# Patient Record
Sex: FEMALE | Race: WHITE | URBAN - METROPOLITAN AREA
[De-identification: names, ages, dates, MRNs, and addresses within clinical notes are randomized per-mention and may not be internally consistent; named-entity substitution may affect disease eponyms.]

---

## 2023-02-28 LAB
ALANINE AMINOTRANSFERASE (SGPT) (U/L) IN SER/PLAS: 41 U/L (ref 7–45)
ALBUMIN (G/DL) IN SER/PLAS: 4.4 G/DL (ref 3.4–5)
ALKALINE PHOSPHATASE (U/L) IN SER/PLAS: 80 U/L (ref 33–110)
ANION GAP IN SER/PLAS: 14 MMOL/L (ref 10–20)
ASPARTATE AMINOTRANSFERASE (SGOT) (U/L) IN SER/PLAS: 17 U/L (ref 9–39)
BILIRUBIN TOTAL (MG/DL) IN SER/PLAS: 0.4 MG/DL (ref 0–1.2)
CALCIUM (MG/DL) IN SER/PLAS: 9.4 MG/DL (ref 8.6–10.3)
CARBON DIOXIDE, TOTAL (MMOL/L) IN SER/PLAS: 26 MMOL/L (ref 21–32)
CHLORIDE (MMOL/L) IN SER/PLAS: 102 MMOL/L (ref 98–107)
CREATININE (MG/DL) IN SER/PLAS: 0.64 MG/DL (ref 0.5–1.05)
DEAMIDATED GLIADIN PEPTIDE IGA: <1 U/ML (ref 0–14)
DEAMIDATED GLIADIN PEPTIDE IGG: <1 U/ML (ref 0–14)
ERYTHROCYTE DISTRIBUTION WIDTH (RATIO) BY AUTOMATED COUNT: 12 % (ref 11.5–14.5)
ERYTHROCYTE MEAN CORPUSCULAR HEMOGLOBIN CONCENTRATION (G/DL) BY AUTOMATED: 33 G/DL (ref 32–36)
ERYTHROCYTE MEAN CORPUSCULAR VOLUME (FL) BY AUTOMATED COUNT: 91 FL (ref 80–100)
ERYTHROCYTES (10*6/UL) IN BLOOD BY AUTOMATED COUNT: 4.7 X10E12/L (ref 4–5.2)
GFR FEMALE: >90 ML/MIN/1.73M2
GLUCOSE (MG/DL) IN SER/PLAS: 78 MG/DL (ref 74–99)
HEMATOCRIT (%) IN BLOOD BY AUTOMATED COUNT: 42.7 % (ref 36–46)
HEMOGLOBIN (G/DL) IN BLOOD: 14.1 G/DL (ref 12–16)
LEUKOCYTES (10*3/UL) IN BLOOD BY AUTOMATED COUNT: 7.4 X10E9/L (ref 4.4–11.3)
PLATELETS (10*3/UL) IN BLOOD AUTOMATED COUNT: 287 X10E9/L (ref 150–450)
POTASSIUM (MMOL/L) IN SER/PLAS: 4.4 MMOL/L (ref 3.5–5.3)
PROTEIN TOTAL: 7.3 G/DL (ref 6.4–8.2)
SODIUM (MMOL/L) IN SER/PLAS: 138 MMOL/L (ref 136–145)
TISSUE TRANSGLUTAMINASE IGG: <1 U/ML (ref 0–14)
TISSUE TRANSGLUTAMINASE, IGA: <1 U/ML (ref 0–14)
UREA NITROGEN (MG/DL) IN SER/PLAS: 13 MG/DL (ref 6–23)

## 2023-03-16 PROBLEM — M72.2 PLANTAR FASCIITIS, RIGHT: Status: ACTIVE | Noted: 2023-03-16

## 2023-03-16 PROBLEM — M54.50 RIGHT-SIDED LOW BACK PAIN WITHOUT SCIATICA: Status: ACTIVE | Noted: 2023-03-16

## 2023-03-16 PROBLEM — I10 HYPERTENSION: Status: ACTIVE | Noted: 2023-03-16

## 2023-03-16 PROBLEM — J45.909 AB (ASTHMATIC BRONCHITIS) (HHS-HCC): Status: ACTIVE | Noted: 2023-03-16

## 2023-03-16 PROBLEM — E03.9 HYPOTHYROIDISM: Status: ACTIVE | Noted: 2023-03-16

## 2023-03-16 PROBLEM — F33.0 MILD EPISODE OF RECURRENT MAJOR DEPRESSIVE DISORDER (CMS-HCC): Status: ACTIVE | Noted: 2023-03-16

## 2023-03-16 PROBLEM — E66.01 MORBID OBESITY (MULTI): Status: ACTIVE | Noted: 2023-03-16

## 2023-03-16 PROBLEM — R10.2 PELVIC PAIN: Status: ACTIVE | Noted: 2023-03-16

## 2023-03-16 PROBLEM — R14.0 ABDOMINAL BLOATING: Status: ACTIVE | Noted: 2023-03-16

## 2023-03-16 PROBLEM — M54.9 BACK PAIN: Status: ACTIVE | Noted: 2023-03-16

## 2023-03-16 PROBLEM — G56.03 CARPAL TUNNEL SYNDROME ON BOTH SIDES: Status: ACTIVE | Noted: 2023-03-16

## 2023-03-16 PROBLEM — F41.9 ANXIETY: Status: ACTIVE | Noted: 2023-03-16

## 2023-03-16 PROBLEM — E66.811 OBESITY (BMI 30.0-34.9): Status: ACTIVE | Noted: 2023-03-16

## 2023-03-16 PROBLEM — R12 HEARTBURN: Status: ACTIVE | Noted: 2023-03-16

## 2023-03-16 PROBLEM — G57.81 NEURITIS OF RIGHT SURAL NERVE: Status: ACTIVE | Noted: 2023-03-16

## 2023-03-16 PROBLEM — K64.8 INTERNAL AND EXTERNAL PROLAPSED HEMORRHOIDS: Status: ACTIVE | Noted: 2023-03-16

## 2023-03-16 PROBLEM — K64.4 EXTERNAL HEMORRHOIDS: Status: ACTIVE | Noted: 2023-03-16

## 2023-03-16 PROBLEM — F41.1 GAD (GENERALIZED ANXIETY DISORDER): Status: ACTIVE | Noted: 2023-03-16

## 2023-03-16 PROBLEM — E66.9 OBESITY (BMI 30.0-34.9): Status: ACTIVE | Noted: 2023-03-16

## 2023-03-16 RX ORDER — LEVOTHYROXINE SODIUM 50 UG/1
1 TABLET ORAL DAILY
COMMUNITY
Start: 2022-06-27 | End: 2023-06-27 | Stop reason: SDUPTHER

## 2023-03-16 RX ORDER — METOPROLOL SUCCINATE 25 MG/1
1 TABLET, EXTENDED RELEASE ORAL DAILY
COMMUNITY
Start: 2017-11-03 | End: 2023-03-28 | Stop reason: SDUPTHER

## 2023-03-16 RX ORDER — ESCITALOPRAM OXALATE 20 MG/1
1 TABLET ORAL DAILY
COMMUNITY
Start: 2021-11-01 | End: 2023-06-27 | Stop reason: SDUPTHER

## 2023-03-16 RX ORDER — FAMOTIDINE 20 MG/1
1 TABLET, FILM COATED ORAL NIGHTLY
COMMUNITY
Start: 2022-07-20 | End: 2023-06-07 | Stop reason: ALTCHOICE

## 2023-03-16 RX ORDER — VIT C/E/ZN/COPPR/LUTEIN/ZEAXAN 250MG-90MG
1 CAPSULE ORAL DAILY
COMMUNITY
Start: 2023-02-28 | End: 2023-10-26 | Stop reason: ALTCHOICE

## 2023-03-16 RX ORDER — MINERAL OIL
180 ENEMA (ML) RECTAL DAILY
COMMUNITY
End: 2023-10-26 | Stop reason: ALTCHOICE

## 2023-03-16 RX ORDER — FLUTICASONE PROPIONATE 50 MCG
2 SPRAY, SUSPENSION (ML) NASAL DAILY
COMMUNITY
Start: 2022-06-21

## 2023-03-16 RX ORDER — CYCLOBENZAPRINE HCL 5 MG
1 TABLET ORAL 3 TIMES DAILY PRN
COMMUNITY
Start: 2023-03-01 | End: 2023-06-07 | Stop reason: ALTCHOICE

## 2023-03-16 RX ORDER — OMEPRAZOLE 40 MG/1
1 CAPSULE, DELAYED RELEASE ORAL DAILY
COMMUNITY
Start: 2020-12-24 | End: 2023-06-07

## 2023-03-16 RX ORDER — NAPROXEN 500 MG/1
1 TABLET ORAL EVERY 12 HOURS
COMMUNITY
Start: 2023-03-03 | End: 2023-06-07 | Stop reason: ALTCHOICE

## 2023-03-17 ENCOUNTER — TELEPHONE (OUTPATIENT)
Dept: PRIMARY CARE | Facility: CLINIC | Age: 43
End: 2023-03-17
Payer: COMMERCIAL

## 2023-03-17 NOTE — TELEPHONE ENCOUNTER
Her symptoms are likely related to lactose intolerance. She can avoid dairy and try Lactaid pills prior to ingesting dairy to see if that helps. She can try lactose free milk products as well. For some people as they get older they don't produce enough of the enzyme lactase which breaks down lactose in the gut. This lack of enough enzyme causes the sx she has been having. Some people are able to tolerate some types of dairy better then others.

## 2023-03-17 NOTE — TELEPHONE ENCOUNTER
Pt has not been eating dairy for 2 weeks, and she said she has noticed a change in her bloating and gas. If she does ingest dairy she still gets those symptoms

## 2023-03-27 ENCOUNTER — TELEPHONE (OUTPATIENT)
Dept: PRIMARY CARE | Facility: CLINIC | Age: 43
End: 2023-03-27
Payer: COMMERCIAL

## 2023-03-27 DIAGNOSIS — I10 PRIMARY HYPERTENSION: Primary | ICD-10-CM

## 2023-03-27 NOTE — TELEPHONE ENCOUNTER
Pt is calling in regards to her Metoprolol Succinate ER 25 MG. Her prescription that was sent over on 22 has  they were filling her prescription that was sent on 22. They need a new prescription sent.     REFILL  MEDICATION:     Metoprolol Succinate ER 25 MG; Take 1 tablet daily.     [facility]: [facility]   PHARM NUMBER: [phone]    LR: 22  90 tablets with 3 refills   LV: 22  NV: 23

## 2023-03-28 RX ORDER — METOPROLOL SUCCINATE 25 MG/1
25 TABLET, EXTENDED RELEASE ORAL DAILY
Qty: 90 TABLET | Refills: 0 | Status: SHIPPED | OUTPATIENT
Start: 2023-03-28 | End: 2023-06-27 | Stop reason: SDUPTHER

## 2023-04-11 ENCOUNTER — OFFICE VISIT (OUTPATIENT)
Dept: PRIMARY CARE | Facility: CLINIC | Age: 43
End: 2023-04-11
Payer: COMMERCIAL

## 2023-04-11 VITALS
WEIGHT: 233 LBS | DIASTOLIC BLOOD PRESSURE: 64 MMHG | BODY MASS INDEX: 34.51 KG/M2 | HEIGHT: 69 IN | SYSTOLIC BLOOD PRESSURE: 122 MMHG | TEMPERATURE: 96.9 F

## 2023-04-11 DIAGNOSIS — M54.50 LOW BACK PAIN, UNSPECIFIED BACK PAIN LATERALITY, UNSPECIFIED CHRONICITY, UNSPECIFIED WHETHER SCIATICA PRESENT: Primary | ICD-10-CM

## 2023-04-11 DIAGNOSIS — E66.9 OBESITY (BMI 30.0-34.9): ICD-10-CM

## 2023-04-11 DIAGNOSIS — E73.8 ISOLATED LACTOSE DEFICIENCY: ICD-10-CM

## 2023-04-11 DIAGNOSIS — R12 HEARTBURN: ICD-10-CM

## 2023-04-11 DIAGNOSIS — R14.0 ABDOMINAL BLOATING: ICD-10-CM

## 2023-04-11 PROCEDURE — 1036F TOBACCO NON-USER: CPT | Performed by: FAMILY MEDICINE

## 2023-04-11 PROCEDURE — 3074F SYST BP LT 130 MM HG: CPT | Performed by: FAMILY MEDICINE

## 2023-04-11 PROCEDURE — 99214 OFFICE O/P EST MOD 30 MIN: CPT | Performed by: FAMILY MEDICINE

## 2023-04-11 PROCEDURE — 3008F BODY MASS INDEX DOCD: CPT | Performed by: FAMILY MEDICINE

## 2023-04-11 PROCEDURE — 3078F DIAST BP <80 MM HG: CPT | Performed by: FAMILY MEDICINE

## 2023-04-11 ASSESSMENT — PATIENT HEALTH QUESTIONNAIRE - PHQ9
2. FEELING DOWN, DEPRESSED OR HOPELESS: NOT AT ALL
SUM OF ALL RESPONSES TO PHQ9 QUESTIONS 1 AND 2: 0
1. LITTLE INTEREST OR PLEASURE IN DOING THINGS: NOT AT ALL

## 2023-04-11 NOTE — PROGRESS NOTES
"Chief complaint:   Chief Complaint   Patient presents with    Follow-up     Medication       HPI:  Magaly Jordan is a 43 y.o. female who presents for evaluation of abdominal pain and bloating. This has essentially resolved/improved dramatically with the avoidance of dairy or use of lactase enzyme product prior to dairy intake. She has definitely noted a relation to her sx and dairy.     She has hip pain and she has back pain which has been more chronic and comes and goes. No numbness/tingling/radiation of pain at this time.    2 weeks ago went to urgent care after losing her voice. Negative COVID-19 testing at home. She was tested for strep and was negative. She had a viral illness. She still clears her throat sometimes.     Physical exam:  /64 (BP Location: Left arm, Patient Position: Sitting)   Temp 36.1 °C (96.9 °F)   Ht 1.74 m (5' 8.5\")   Wt 106 kg (233 lb)   BMI 34.91 kg/m²   General: NAD, well appearing female  Heart: RRR, no mumur appreciated  Lungs: CTAB, no wheezes, rales, rhonchi  Abdomen: soft, non tender, normoactive BS, no organomegaly  Extremities: No LE edema    Assessment/Plan   Problem List Items Addressed This Visit          Digestive    Heartburn       Endocrine/Metabolic    Obesity (BMI 30.0-34.9)    BMI 34.0-34.9,adult       Other    Abdominal bloating    Back pain - Primary    Relevant Orders    Referral to Physical Therapy    Isolated lactose deficiency   Heartburn has resolved with PPI, Bloating has resolved with avoidance of dairy  Back and hip pain, recommend PT, follow up 4-6 weeks if not improved    Magaly Rivera, DO        "

## 2023-04-12 PROBLEM — J45.909 AB (ASTHMATIC BRONCHITIS) (HHS-HCC): Status: RESOLVED | Noted: 2023-03-16 | Resolved: 2023-04-12

## 2023-04-12 PROBLEM — E73.8: Status: ACTIVE | Noted: 2023-04-12

## 2023-04-12 PROBLEM — E66.01 MORBID OBESITY (MULTI): Status: RESOLVED | Noted: 2023-03-16 | Resolved: 2023-04-12

## 2023-05-08 ENCOUNTER — TELEPHONE (OUTPATIENT)
Dept: PRIMARY CARE | Facility: CLINIC | Age: 43
End: 2023-05-08
Payer: COMMERCIAL

## 2023-05-08 DIAGNOSIS — R12 HEARTBURN: Primary | ICD-10-CM

## 2023-05-08 NOTE — TELEPHONE ENCOUNTER
Pt is calling in regards to her Omeprazole 40 MG. Pt said that you wanted her to take if for two months and then stop to see how it is. Pt said she has been off of the medication for a week and all of her symptoms have come back. She said she has bloating, gas, and heartburn. Pt is asking if you can refill this medication?     [facility]   Phone Number: [phone]

## 2023-05-09 RX ORDER — PANTOPRAZOLE SODIUM 40 MG/1
40 TABLET, DELAYED RELEASE ORAL DAILY
Qty: 90 TABLET | Refills: 1 | Status: SHIPPED | OUTPATIENT
Start: 2023-05-09 | End: 2023-06-27

## 2023-05-09 NOTE — TELEPHONE ENCOUNTER
I would like her to have H. Pylori testing prior to going back on the PPI. She will need to be off of it for a full 2 weeks minimum prior to tgetting the testing then can restart the medication which I will send for her.

## 2023-05-10 ENCOUNTER — TELEPHONE (OUTPATIENT)
Dept: PRIMARY CARE | Facility: CLINIC | Age: 43
End: 2023-05-10
Payer: COMMERCIAL

## 2023-05-10 NOTE — TELEPHONE ENCOUNTER
Pt would like to know if she can take Pepecid in those 2 weeks that she is off of Omeprazole?  Or what can she take

## 2023-05-15 ENCOUNTER — TELEPHONE (OUTPATIENT)
Dept: PRIMARY CARE | Facility: CLINIC | Age: 43
End: 2023-05-15
Payer: COMMERCIAL

## 2023-05-15 DIAGNOSIS — R12 HEARTBURN: Primary | ICD-10-CM

## 2023-05-15 NOTE — TELEPHONE ENCOUNTER
Pt called stating that she tried scheduling her H plyori stress test (unsure of correct spelling) but was told that the test was not ordered. Can you place this order so pt can schedule?

## 2023-05-16 NOTE — TELEPHONE ENCOUNTER
It was ordered and active from 5/9/2023- I reordered the test- It does not need to be scheduled, she can just go to the lab fasting and they can do it.

## 2023-05-18 ENCOUNTER — LAB (OUTPATIENT)
Dept: LAB | Facility: LAB | Age: 43
End: 2023-05-18
Payer: COMMERCIAL

## 2023-05-18 DIAGNOSIS — R12 HEARTBURN: ICD-10-CM

## 2023-05-18 PROCEDURE — 83013 H PYLORI (C-13) BREATH: CPT

## 2023-05-19 ENCOUNTER — TELEPHONE (OUTPATIENT)
Dept: PRIMARY CARE | Facility: CLINIC | Age: 43
End: 2023-05-19
Payer: COMMERCIAL

## 2023-05-19 LAB — H. PYLORI UBIT: NEGATIVE

## 2023-05-19 NOTE — TELEPHONE ENCOUNTER
----- Message from Magaly Rivera DO sent at 5/19/2023 12:38 PM EDT -----  Negative Hpylor testing, can restart PPI

## 2023-06-07 ENCOUNTER — OFFICE VISIT (OUTPATIENT)
Dept: PRIMARY CARE | Facility: CLINIC | Age: 43
End: 2023-06-07
Payer: COMMERCIAL

## 2023-06-07 VITALS
TEMPERATURE: 98.7 F | DIASTOLIC BLOOD PRESSURE: 72 MMHG | BODY MASS INDEX: 34.91 KG/M2 | SYSTOLIC BLOOD PRESSURE: 118 MMHG | WEIGHT: 233 LBS

## 2023-06-07 DIAGNOSIS — R21 RASH: Primary | ICD-10-CM

## 2023-06-07 PROCEDURE — 3008F BODY MASS INDEX DOCD: CPT | Performed by: FAMILY MEDICINE

## 2023-06-07 PROCEDURE — 1036F TOBACCO NON-USER: CPT | Performed by: FAMILY MEDICINE

## 2023-06-07 PROCEDURE — 3074F SYST BP LT 130 MM HG: CPT | Performed by: FAMILY MEDICINE

## 2023-06-07 PROCEDURE — 99213 OFFICE O/P EST LOW 20 MIN: CPT | Performed by: FAMILY MEDICINE

## 2023-06-07 PROCEDURE — 3078F DIAST BP <80 MM HG: CPT | Performed by: FAMILY MEDICINE

## 2023-06-07 RX ORDER — TRIAMCINOLONE ACETONIDE 1 MG/G
OINTMENT TOPICAL 2 TIMES DAILY PRN
Qty: 30 G | Refills: 0 | Status: SHIPPED | OUTPATIENT
Start: 2023-06-07 | End: 2023-10-05

## 2023-06-07 ASSESSMENT — PATIENT HEALTH QUESTIONNAIRE - PHQ9
2. FEELING DOWN, DEPRESSED OR HOPELESS: NOT AT ALL
1. LITTLE INTEREST OR PLEASURE IN DOING THINGS: NOT AT ALL
SUM OF ALL RESPONSES TO PHQ9 QUESTIONS 1 AND 2: 0

## 2023-06-07 NOTE — PROGRESS NOTES
Chief complaint:   Chief Complaint   Patient presents with    rash on belly and upper legs       HPI:  Magaly Jordan is a 43 y.o. female who presents for evaluation of rash on her abdomen, back and upper legs which comes and goes and occurs daily. It is itchy.     Physical exam:  /72 (BP Location: Left arm, Patient Position: Sitting)   Temp 37.1 °C (98.7 °F)   Wt 106 kg (233 lb)   BMI 34.91 kg/m²   General: NAD, well appearing female  Skin: legs with scattered patches of mildly erythematous skin, excoriation without redness abdomen, no lesions noted on the back    Assessment/Plan   Problem List Items Addressed This Visit    None  Visit Diagnoses       Rash    -  Primary    Relevant Medications    triamcinolone (Kenalog) 0.1 % ointment        Avoidance of all scented products. No dryer sheets, fabric softener. Use unscented detergent (hypoallergenic) and Dove sensitive soap/ lotion. Triamcinolone to patches BID until gone. Follow up 2 weeks if not gone, sooner if it changes or worsens.     Magaly Rivera,

## 2023-06-26 PROBLEM — R30.0 DYSURIA: Status: RESOLVED | Noted: 2023-06-26 | Resolved: 2023-06-26

## 2023-06-26 PROBLEM — N94.9 VAGINAL BURNING: Status: RESOLVED | Noted: 2023-06-26 | Resolved: 2023-06-26

## 2023-06-26 PROBLEM — N94.89 VAGINAL BURNING: Status: RESOLVED | Noted: 2023-06-26 | Resolved: 2023-06-26

## 2023-06-26 PROBLEM — R30.0 BURNING WITH URINATION: Status: RESOLVED | Noted: 2023-06-26 | Resolved: 2023-06-26

## 2023-06-26 PROBLEM — N76.0 VAGINITIS: Status: RESOLVED | Noted: 2022-10-26 | Resolved: 2023-06-26

## 2023-06-27 ENCOUNTER — OFFICE VISIT (OUTPATIENT)
Dept: PRIMARY CARE | Facility: CLINIC | Age: 43
End: 2023-06-27
Payer: COMMERCIAL

## 2023-06-27 VITALS
HEIGHT: 69 IN | DIASTOLIC BLOOD PRESSURE: 76 MMHG | SYSTOLIC BLOOD PRESSURE: 124 MMHG | BODY MASS INDEX: 34.36 KG/M2 | WEIGHT: 232 LBS

## 2023-06-27 DIAGNOSIS — E03.9 HYPOTHYROIDISM, UNSPECIFIED TYPE: ICD-10-CM

## 2023-06-27 DIAGNOSIS — F41.1 GAD (GENERALIZED ANXIETY DISORDER): ICD-10-CM

## 2023-06-27 DIAGNOSIS — Z30.09 FAMILY PLANNING: ICD-10-CM

## 2023-06-27 DIAGNOSIS — Z00.00 WELLNESS EXAMINATION: Primary | ICD-10-CM

## 2023-06-27 DIAGNOSIS — F32.9 MAJOR DEPRESSIVE EPISODE: ICD-10-CM

## 2023-06-27 DIAGNOSIS — R12 HEARTBURN: ICD-10-CM

## 2023-06-27 DIAGNOSIS — I10 PRIMARY HYPERTENSION: ICD-10-CM

## 2023-06-27 PROBLEM — F33.0 MILD EPISODE OF RECURRENT MAJOR DEPRESSIVE DISORDER (CMS-HCC): Status: RESOLVED | Noted: 2023-03-16 | Resolved: 2023-06-27

## 2023-06-27 PROCEDURE — 3078F DIAST BP <80 MM HG: CPT | Performed by: FAMILY MEDICINE

## 2023-06-27 PROCEDURE — 3008F BODY MASS INDEX DOCD: CPT | Performed by: FAMILY MEDICINE

## 2023-06-27 PROCEDURE — 3074F SYST BP LT 130 MM HG: CPT | Performed by: FAMILY MEDICINE

## 2023-06-27 PROCEDURE — 1036F TOBACCO NON-USER: CPT | Performed by: FAMILY MEDICINE

## 2023-06-27 PROCEDURE — 99396 PREV VISIT EST AGE 40-64: CPT | Performed by: FAMILY MEDICINE

## 2023-06-27 RX ORDER — PANTOPRAZOLE SODIUM 20 MG/1
20 TABLET, DELAYED RELEASE ORAL
Qty: 30 TABLET | Refills: 11 | Status: SHIPPED | OUTPATIENT
Start: 2023-06-27 | End: 2023-07-25 | Stop reason: SDUPTHER

## 2023-06-27 RX ORDER — LEVOTHYROXINE SODIUM 50 UG/1
50 TABLET ORAL DAILY
Qty: 90 TABLET | Refills: 3 | Status: SHIPPED | OUTPATIENT
Start: 2023-06-27 | End: 2023-07-25 | Stop reason: SDUPTHER

## 2023-06-27 RX ORDER — BUPROPION HYDROCHLORIDE 150 MG/1
150 TABLET ORAL EVERY MORNING
Qty: 30 TABLET | Refills: 5 | Status: SHIPPED | OUTPATIENT
Start: 2023-06-27 | End: 2023-07-25

## 2023-06-27 RX ORDER — ESCITALOPRAM OXALATE 20 MG/1
20 TABLET ORAL DAILY
Qty: 90 TABLET | Refills: 3 | Status: SHIPPED | OUTPATIENT
Start: 2023-06-27 | End: 2023-07-25 | Stop reason: SDUPTHER

## 2023-06-27 RX ORDER — LORATADINE 10 MG/1
10 TABLET ORAL DAILY
COMMUNITY

## 2023-06-27 RX ORDER — METOPROLOL SUCCINATE 25 MG/1
25 TABLET, EXTENDED RELEASE ORAL DAILY
Qty: 90 TABLET | Refills: 3 | Status: SHIPPED | OUTPATIENT
Start: 2023-06-27 | End: 2023-07-25 | Stop reason: SDUPTHER

## 2023-06-27 NOTE — PROGRESS NOTES
"Chief complaint:   Chief Complaint   Patient presents with    Annual Exam     CPE       HPI:  Magaly Jordan is a 43 y.o. female who presents for a physical.    She states she has been taking Pantoprazole which has been helping. She no longer has any heartburn sx.     She still gets gassy with beans and broccoli    Dad is in a nursing home and has been told its dementia. She states he goes in an out of making sense. They have a neurology appt 7/11.    She is getting  in August. She is under a lot of stress.    She is interested in having a baby.     ROS:  Constitutional:  Denies fevers, chills, night sweats  HEENT: Denies change in vision, change in hearing, sore throat, rhinorrhea, congestion  Cardiovascular: Denies chest pain, SOB, racing heart, slow heart rate, palpitations, leg edema  Pulmonary: Denies cough, wheezing, SOB  Gastrointestinal: Admits to gas and bloating Denies abdominal pain, diarrhea, constipation, nausea, vomiting, heartburn  Genitourinary: Denies dysuria, hematuria, incontinence, abnormal vaginal bleeding, abnormal vaginal discharge  Integumentary: Denies rash, new or changed skin lesions  Neuro: admits to itching Denies headache, numbness, tingling  Musculoskeletal: Denies myalgias, arthralgias, back pain  Psych:  admits to change in mood, sleeping difficulties  Heme: denies bruising or bleeding     Physical exam:  /76   Ht 1.74 m (5' 8.5\")   Wt 105 kg (232 lb)   BMI 34.76 kg/m²   General: NAD, well appearing female  Head: normocephalic  Ears: EAC patent, TM normal bilaterally  Eyes: EOM intact, PERRLA  Nose: moist  Mouth: moist, good dentition  Heart: RRR, no murmur appreciated  Lungs: CTAB, no wheezes, rales, rhonchi  Abdomen: soft, non tender, no organomegaly  Psych: mood and affect congruent, alert and oriented  MSK: +5/5 gross strength  Neuro: +2/4 patellar and biceps reflexes, sensation grossly intact    Assessment/Plan   Problem List Items Addressed This Visit       " Heartburn    Relevant Medications    pantoprazole (Protonix) 20 mg EC tablet    DIANNE (generalized anxiety disorder)    Relevant Medications    escitalopram (Lexapro) 20 mg tablet    Hypertension    Relevant Medications    metoprolol succinate XL (Toprol-XL) 25 mg 24 hr tablet    Hypothyroidism    Relevant Medications    levothyroxine (Synthroid, Levoxyl) 50 mcg tablet     Other Visit Diagnoses       Wellness examination    -  Primary    Family planning        Relevant Orders    Referral to Obstetrics / Gynecology    Major depressive episode        Relevant Medications    buPROPion XL (Wellbutrin XL) 150 mg 24 hr tablet          Follow up 4-6 weeks for re-evaluation as Wellbutrin was added to her Lexapro 20 mg PO daily for MDD.  Magaly Rivera, DO

## 2023-07-25 ENCOUNTER — OFFICE VISIT (OUTPATIENT)
Dept: PRIMARY CARE | Facility: CLINIC | Age: 43
End: 2023-07-25
Payer: COMMERCIAL

## 2023-07-25 VITALS — SYSTOLIC BLOOD PRESSURE: 124 MMHG | BODY MASS INDEX: 34.46 KG/M2 | WEIGHT: 230 LBS | DIASTOLIC BLOOD PRESSURE: 70 MMHG

## 2023-07-25 DIAGNOSIS — F41.9 ANXIETY: Primary | ICD-10-CM

## 2023-07-25 DIAGNOSIS — E66.9 OBESITY (BMI 30.0-34.9): ICD-10-CM

## 2023-07-25 DIAGNOSIS — R12 HEARTBURN: ICD-10-CM

## 2023-07-25 DIAGNOSIS — E03.9 HYPOTHYROIDISM, UNSPECIFIED TYPE: ICD-10-CM

## 2023-07-25 DIAGNOSIS — I10 PRIMARY HYPERTENSION: ICD-10-CM

## 2023-07-25 DIAGNOSIS — F32.9 MAJOR DEPRESSIVE EPISODE: ICD-10-CM

## 2023-07-25 DIAGNOSIS — F41.1 GAD (GENERALIZED ANXIETY DISORDER): ICD-10-CM

## 2023-07-25 PROCEDURE — 3078F DIAST BP <80 MM HG: CPT | Performed by: FAMILY MEDICINE

## 2023-07-25 PROCEDURE — 99213 OFFICE O/P EST LOW 20 MIN: CPT | Performed by: FAMILY MEDICINE

## 2023-07-25 PROCEDURE — 1036F TOBACCO NON-USER: CPT | Performed by: FAMILY MEDICINE

## 2023-07-25 PROCEDURE — 3074F SYST BP LT 130 MM HG: CPT | Performed by: FAMILY MEDICINE

## 2023-07-25 PROCEDURE — 3008F BODY MASS INDEX DOCD: CPT | Performed by: FAMILY MEDICINE

## 2023-07-25 RX ORDER — ALPRAZOLAM 0.25 MG/1
0.25 TABLET ORAL 3 TIMES DAILY PRN
Qty: 4 TABLET | Refills: 0 | Status: SHIPPED | OUTPATIENT
Start: 2023-07-25 | End: 2023-11-02 | Stop reason: ALTCHOICE

## 2023-07-25 RX ORDER — ESCITALOPRAM OXALATE 20 MG/1
20 TABLET ORAL DAILY
Qty: 90 TABLET | Refills: 3 | Status: SHIPPED | OUTPATIENT
Start: 2023-07-25

## 2023-07-25 RX ORDER — METOPROLOL SUCCINATE 25 MG/1
25 TABLET, EXTENDED RELEASE ORAL DAILY
Qty: 90 TABLET | Refills: 3 | Status: SHIPPED | OUTPATIENT
Start: 2023-07-25

## 2023-07-25 RX ORDER — PANTOPRAZOLE SODIUM 20 MG/1
20 TABLET, DELAYED RELEASE ORAL
Qty: 30 TABLET | Refills: 11 | Status: SHIPPED | OUTPATIENT
Start: 2023-07-25 | End: 2024-07-24

## 2023-07-25 RX ORDER — LEVOTHYROXINE SODIUM 50 UG/1
50 TABLET ORAL DAILY
Qty: 90 TABLET | Refills: 3 | Status: SHIPPED | OUTPATIENT
Start: 2023-07-25 | End: 2024-01-02 | Stop reason: DRUGHIGH

## 2023-07-25 NOTE — PROGRESS NOTES
Chief complaint:   Chief Complaint   Patient presents with    Follow-up     1 month follow up after starting Wellbutrin        HPI:  Magaly Jordan is a 43 y.o. female who presents for evaluation of depression and anxiety. Since starting the Wellbutrin she feels less depressed but more anxious and states she has had a few panic attack sx recently. She states her father is still undergoing testing and has scans for early August. She has her wedding in 18 days.     She has had increased anxiety in the past with Wellbutrin.    She needs med scripts.     Physical exam:  /70   Wt 104 kg (230 lb)   BMI 34.46 kg/m²   General: NAD, well appearing female  Psych: alert and oriented, mood and affect congruent    Assessment/Plan   Problem List Items Addressed This Visit       Anxiety - Primary     - Xanax 0.25 mg PO daily PRN anxiety with flying for prior flight for her [employer]  - Controlled substance agreement signed today  - [place]AR[place] checked and verified today         Relevant Medications    ALPRAZolam (Xanax) 0.25 mg tablet    Heartburn     - well controlled  - continue Pantoprazole 20 mg PO daily         Relevant Medications    pantoprazole (Protonix) 20 mg EC tablet    DIANNE (generalized anxiety disorder)     - Continue Lexapro 20 mg PO daily, discussed changing medication but as she is getting  in a few weeks would like to defer. Stop Wellbutrin as her anxiety has worsened with this addition.   - call/follow up PRN  - continue care with psychiatry and psychology         Relevant Medications    escitalopram (Lexapro) 20 mg tablet    Hypertension     - at goal, continue Metoprolol 25 mg PO daily         Relevant Medications    metoprolol succinate XL (Toprol-XL) 25 mg 24 hr tablet    Hypothyroidism     - continue Levothyroxine 50 mcg PO daily  - last TSH 1/2023         Relevant Medications    levothyroxine (Synthroid, Levoxyl) 50 mcg tablet    Obesity (BMI 30.0-34.9)    BMI 34.0-34.9,adult    Major depressive  episode     - continue Lexapro 20 mg PO daily  - continue care with psychiatry and psychology            Magaly Rivera DO

## 2023-07-25 NOTE — ASSESSMENT & PLAN NOTE
- Continue Lexapro 20 mg PO daily, discussed changing medication but as she is getting  in a few weeks would like to defer. Stop Wellbutrin as her anxiety has worsened with this addition.   - call/follow up PRN  - continue care with psychiatry and psychology

## 2023-08-28 PROBLEM — J06.9 URTI (ACUTE UPPER RESPIRATORY INFECTION): Status: ACTIVE | Noted: 2023-08-28

## 2023-08-28 RX ORDER — HYDROXYZINE HYDROCHLORIDE 25 MG/1
25 TABLET, FILM COATED ORAL 3 TIMES DAILY PRN
COMMUNITY
End: 2023-10-26 | Stop reason: ALTCHOICE

## 2023-08-28 RX ORDER — CYCLOBENZAPRINE HCL 5 MG
5 TABLET ORAL 3 TIMES DAILY PRN
COMMUNITY
End: 2023-10-26 | Stop reason: ALTCHOICE

## 2023-08-28 RX ORDER — BUPROPION HYDROCHLORIDE 150 MG/1
150 TABLET, EXTENDED RELEASE ORAL
COMMUNITY
End: 2023-10-26 | Stop reason: ALTCHOICE

## 2023-08-28 RX ORDER — NAPROXEN 500 MG/1
500 TABLET ORAL
COMMUNITY
End: 2023-10-26 | Stop reason: ALTCHOICE

## 2023-10-05 ENCOUNTER — TELEMEDICINE (OUTPATIENT)
Dept: BEHAVIORAL HEALTH | Facility: CLINIC | Age: 43
End: 2023-10-05
Payer: COMMERCIAL

## 2023-10-05 DIAGNOSIS — F41.1 GAD (GENERALIZED ANXIETY DISORDER): ICD-10-CM

## 2023-10-05 DIAGNOSIS — F33.0 MILD EPISODE OF RECURRENT MAJOR DEPRESSIVE DISORDER (CMS-HCC): ICD-10-CM

## 2023-10-05 PROCEDURE — 90837 PSYTX W PT 60 MINUTES: CPT | Performed by: PSYCHOLOGIST

## 2023-10-05 PROCEDURE — 90837 PSYTX W PT 60 MINUTES: CPT | Mod: 95 | Performed by: PSYCHOLOGIST

## 2023-10-05 NOTE — Clinical Note
Please schedule follow-up for October 30th at 10a, November 13th at 10a and November 30th at 10a  and December 11th at 10a

## 2023-10-05 NOTE — PROGRESS NOTES
Start time: 10:06a  End time: 10:59a      The patient was informed of the current need to conduct treatment via telephone or telehealth due to Covid-19 pandemic. Patient consented to the use of the platform that may not be HIPAA compliant. I have confirmed the patient's identity via the following (minimum of three) acceptable identifiers as per  Policy PH-9:   1. Last 4 of social:   2. :   3. Address:    Telephone/Televideo Informed Consent for Psychotherapy was reviewed with the patient as follows:  There are potential benefits and risks of the use of telephone or video-conferencing that differ from in-person sessions. Specifically, the telephone or televideo system we are using may not be HIPAA compliant and may present limits to patient confidentiality. Confidentiality still applies for telepsychology services, and nobody will record the session without your permission. You agree to use the telephone or video-conferencing platform selected for our virtual sessions, and I will explain how to use it.  1)             You need to use a webcam or smartphone during the session.  2)             It is important that you be in a quiet, private space that is free of distractions (including cell phone or other devices) during the session.  3)             It is important to use a secure internet connection rather than public/free Wi-Fi.  4)             It is important to be on time. If you need to cancel or change your tele-appointment, you must notify the psychologist in advance by phone or email.  5)             We need a back-up plan (e.g., phone number where you can be reached) to restart the session or to reschedule it, in the event of technical problems.  6)             We need a safety plan that includes at least one emergency contact and the closest emergency room to your location, in the event of a crisis situation.  7)             If you are not an adult, we need the permission of your parent or legal guardian  (and their contact information) for you to participate in telepsychology sessions.  Understanding and verbal agreement was attested to by the patient.      Reason for care: Anxiety; Depression  Method of therapy: Supportive  Therapy summary: Pt processed supporting her  during recent stressors. She explored conflict resolution.     She has plans to see her dad today and is feeling anxious. She processed what her recent visits have been like. She reported that she has had difficult visits and finds that others have better visits with him. We discussed ways to improve her visits and validate emotions, whether his or hers. Additionally, she processed dynamics between the rest of her family in relation to her father's health. She noted that they are all managing this experience differently.     She has started walking more around the block. She has wanted to be more conscious of packing her lunches for work. We explored monitoring goals. She participated in a 5k while she was in [place] and feels that this was a great way to practice being active. She would like to continue this.     We reviewed anxiety management strategies for visiting her father.     She denies SI/HI/AVH. We will follow-up in two weeks.   Identified goals/objectives: Utilize anxiety management strategies for visiting her father; identify workable schedule and routine; goal follow-up  Response to therapy: Engaged  Treatment plan and process: Continue with above stated tx goals; Psycho-education on anxiety management strategies; Goal setting for health and wellness; Schedule and Routine exploration

## 2023-10-16 ENCOUNTER — APPOINTMENT (OUTPATIENT)
Dept: BEHAVIORAL HEALTH | Facility: CLINIC | Age: 43
End: 2023-10-16
Payer: COMMERCIAL

## 2023-10-26 ENCOUNTER — OFFICE VISIT (OUTPATIENT)
Dept: OBSTETRICS AND GYNECOLOGY | Facility: CLINIC | Age: 43
End: 2023-10-26
Payer: COMMERCIAL

## 2023-10-26 VITALS
HEIGHT: 68 IN | BODY MASS INDEX: 35.77 KG/M2 | WEIGHT: 236 LBS | SYSTOLIC BLOOD PRESSURE: 110 MMHG | DIASTOLIC BLOOD PRESSURE: 70 MMHG

## 2023-10-26 DIAGNOSIS — Z31.69 ENCOUNTER FOR PRECONCEPTION CONSULTATION: Primary | ICD-10-CM

## 2023-10-26 PROCEDURE — 3008F BODY MASS INDEX DOCD: CPT | Performed by: OBSTETRICS & GYNECOLOGY

## 2023-10-26 PROCEDURE — 99213 OFFICE O/P EST LOW 20 MIN: CPT | Performed by: OBSTETRICS & GYNECOLOGY

## 2023-10-26 PROCEDURE — 3078F DIAST BP <80 MM HG: CPT | Performed by: OBSTETRICS & GYNECOLOGY

## 2023-10-26 PROCEDURE — 3074F SYST BP LT 130 MM HG: CPT | Performed by: OBSTETRICS & GYNECOLOGY

## 2023-10-26 PROCEDURE — 1036F TOBACCO NON-USER: CPT | Performed by: OBSTETRICS & GYNECOLOGY

## 2023-10-26 NOTE — PROGRESS NOTES
Subjective   Patient ID: Magaly Jordan is a 43 y.o. female who presents for preconception counseling (Preconception Counseling///Chaperone Declined: CLAUDETTE Huerta/).  Here because she is unsure about having a baby.  Cycle regular.  Not using birth control since August.   Regular exercise.        Review of Systems    Objective   Physical Exam  Constitutional:       Appearance: She is obese.   Neurological:      Mental Status: She is alert.   Psychiatric:         Mood and Affect: Mood normal.         Behavior: Behavior normal.         Thought Content: Thought content normal.         Judgment: Judgment normal.         Assessment/Plan   Problem List Items Addressed This Visit             ICD-10-CM    Encounter for preconception consultation - Primary Z31.69     You came today to talk about pregnancy.  We discussed that the older the mother, the higher the risk of problems related to pregnancy.  Difficulty getting pregnant.  Miscarriage.  Carrying a fetus with a chromosomal abnormality.   Complications of pregnancy such as diabetes, hypertensive complications,  birth and  delivery.  You should be taking a prenatal vitamin.  Think about testing and or managing a fetus with a chromosomal abnormality.   Call with a positive pregnancy test.

## 2023-10-26 NOTE — ASSESSMENT & PLAN NOTE
You came today to talk about pregnancy.  We discussed that the older the mother, the higher the risk of problems related to pregnancy.  Difficulty getting pregnant.  Miscarriage.  Carrying a fetus with a chromosomal abnormality.   Complications of pregnancy such as diabetes, hypertensive complications,  birth and  delivery.  You should be taking a prenatal vitamin.  Think about testing and or managing a fetus with a chromosomal abnormality.   Call with a positive pregnancy test.

## 2023-10-30 ENCOUNTER — TELEMEDICINE (OUTPATIENT)
Dept: BEHAVIORAL HEALTH | Facility: CLINIC | Age: 43
End: 2023-10-30
Payer: COMMERCIAL

## 2023-10-30 DIAGNOSIS — F33.0 MILD EPISODE OF RECURRENT MAJOR DEPRESSIVE DISORDER (CMS-HCC): ICD-10-CM

## 2023-10-30 DIAGNOSIS — F41.1 GAD (GENERALIZED ANXIETY DISORDER): ICD-10-CM

## 2023-10-30 PROCEDURE — 90837 PSYTX W PT 60 MINUTES: CPT | Performed by: PSYCHOLOGIST

## 2023-10-30 NOTE — PROGRESS NOTES
Start time: 10:05a  End time: 10:59a      The patient was informed of the current need to conduct treatment via telephone or telehealth due to Covid-19 pandemic. Patient consented to the use of the platform that may not be HIPAA compliant. I have confirmed the patient's identity via the following (minimum of three) acceptable identifiers as per  Policy PH-9:   1. Last 4 of social:   2. :   3. Address:    Telephone/Televideo Informed Consent for Psychotherapy was reviewed with the patient as follows:  There are potential benefits and risks of the use of telephone or video-conferencing that differ from in-person sessions. Specifically, the telephone or televideo system we are using may not be HIPAA compliant and may present limits to patient confidentiality. Confidentiality still applies for telepsychology services, and nobody will record the session without your permission. You agree to use the telephone or video-conferencing platform selected for our virtual sessions, and I will explain how to use it.  1)             You need to use a webcam or smartphone during the session.  2)             It is important that you be in a quiet, private space that is free of distractions (including cell phone or other devices) during the session.  3)             It is important to use a secure internet connection rather than public/free Wi-Fi.  4)             It is important to be on time. If you need to cancel or change your tele-appointment, you must notify the psychologist in advance by phone or email.  5)             We need a back-up plan (e.g., phone number where you can be reached) to restart the session or to reschedule it, in the event of technical problems.  6)             We need a safety plan that includes at least one emergency contact and the closest emergency room to your location, in the event of a crisis situation.  7)             If you are not an adult, we need the permission of your parent or legal guardian  "(and their contact information) for you to participate in telepsychology sessions.  Understanding and verbal agreement was attested to by the patient.      Reason for care: Anxiety; Depression  Method of therapy: Supportive  Therapy summary: Pt reported that \"there has been a lot going on.\" She processed recent stressors. She has continued to experience grief related to her father's illness.     She shared that she and her partner have been discussing possibility of having a child. She discussed challenges with indecisiveness. I provided education on confidence and self-esteem related to decision-making.     She discussed following through on her exercise goals. She has not walked around the block or used her exercise machines. She set goal to exercise using the rowing machine for 10 minutes 3x before our next appointment.     She denies SI/HI/AVH. We will follow-up in two weeks.   Identified goals/objectives: Using decision-making skills; Utilize anxiety management strategies for visiting her father; identify workable schedule and routine; goal follow-up  Response to therapy: Engaged  Treatment plan and process: Continue with above stated tx goals; Psycho-education on anxiety management strategies; Goal setting for health and wellness; Schedule and Routine exploration; Education around decision-making  "

## 2023-11-02 ENCOUNTER — OFFICE VISIT (OUTPATIENT)
Dept: PRIMARY CARE | Facility: CLINIC | Age: 43
End: 2023-11-02
Payer: COMMERCIAL

## 2023-11-02 VITALS
BODY MASS INDEX: 35.88 KG/M2 | DIASTOLIC BLOOD PRESSURE: 80 MMHG | TEMPERATURE: 97.9 F | SYSTOLIC BLOOD PRESSURE: 120 MMHG | WEIGHT: 236 LBS

## 2023-11-02 DIAGNOSIS — J01.10 ACUTE FRONTAL SINUSITIS, RECURRENCE NOT SPECIFIED: Primary | ICD-10-CM

## 2023-11-02 PROCEDURE — 1036F TOBACCO NON-USER: CPT | Performed by: FAMILY MEDICINE

## 2023-11-02 PROCEDURE — 3008F BODY MASS INDEX DOCD: CPT | Performed by: FAMILY MEDICINE

## 2023-11-02 PROCEDURE — 3079F DIAST BP 80-89 MM HG: CPT | Performed by: FAMILY MEDICINE

## 2023-11-02 PROCEDURE — 3074F SYST BP LT 130 MM HG: CPT | Performed by: FAMILY MEDICINE

## 2023-11-02 PROCEDURE — 99213 OFFICE O/P EST LOW 20 MIN: CPT | Performed by: FAMILY MEDICINE

## 2023-11-02 RX ORDER — AZITHROMYCIN 250 MG/1
TABLET, FILM COATED ORAL
Qty: 6 TABLET | Refills: 0 | Status: SHIPPED | OUTPATIENT
Start: 2023-11-02 | End: 2023-11-07

## 2023-11-02 ASSESSMENT — PATIENT HEALTH QUESTIONNAIRE - PHQ9
1. LITTLE INTEREST OR PLEASURE IN DOING THINGS: NOT AT ALL
SUM OF ALL RESPONSES TO PHQ9 QUESTIONS 1 AND 2: 0
2. FEELING DOWN, DEPRESSED OR HOPELESS: NOT AT ALL

## 2023-11-02 ASSESSMENT — ENCOUNTER SYMPTOMS: DEPRESSION: 0

## 2023-11-02 NOTE — PROGRESS NOTES
Subjective   Patient ID: [id number]     Magaly Jordan is a 43 y.o. female who presents for Cough, Nasal Congestion, loss of taste, and loss of smell (COVID- (x2).).  HPI  She complains of cough, nasal congestion and the loss of smell and taste.      She twice tested negative for covid.      This started a week and a half ago.  She has frontal sinus pain.  No fever.      No SOB.  She states no one else around her is sick.      No sore throat.    She has a lot of nasal phlegm. She has been taking sudafed.  Objective     /80 (BP Location: Left arm, Patient Position: Sitting)   Temp 36.6 °C (97.9 °F) (Skin)   Wt 107 kg (236 lb)   LMP 10/21/2023   BMI 35.88 kg/m²      Physical Exam  Constitutional:       Appearance: Normal appearance.   HENT:      Right Ear: Tympanic membrane normal.      Left Ear: Tympanic membrane normal.      Nose: Congestion and rhinorrhea present.      Mouth/Throat:      Pharynx: No oropharyngeal exudate or posterior oropharyngeal erythema.   Cardiovascular:      Rate and Rhythm: Normal rate and regular rhythm.      Heart sounds: Normal heart sounds.   Pulmonary:      Effort: Pulmonary effort is normal.      Breath sounds: Normal breath sounds.   Neurological:      Mental Status: She is alert.         Assessment/Plan   Problem List Items Addressed This Visit    None  Visit Diagnoses       Acute frontal sinusitis, recurrence not specified    -  Primary    Relevant Medications    azithromycin (Zithromax) 250 mg tablet        I prescribed antibiotics.  Continue to test for covid if the loss of smell persists.  If positive, quarantine until you feel significantly better.  Return if the symptoms worsen or persist for one week.    Jef Herman, DO

## 2023-11-02 NOTE — PATIENT INSTRUCTIONS
I prescribed antibiotics.  Continue to test for covid if the loss of smell persists.  If positive, quarantine until you feel significantly better.  Return if the symptoms worsen or persist for one week.

## 2023-11-13 ENCOUNTER — TELEMEDICINE (OUTPATIENT)
Dept: BEHAVIORAL HEALTH | Facility: CLINIC | Age: 43
End: 2023-11-13
Payer: COMMERCIAL

## 2023-11-13 DIAGNOSIS — F33.0 MILD EPISODE OF RECURRENT MAJOR DEPRESSIVE DISORDER (CMS-HCC): ICD-10-CM

## 2023-11-13 DIAGNOSIS — F41.1 GAD (GENERALIZED ANXIETY DISORDER): ICD-10-CM

## 2023-11-13 PROCEDURE — 90837 PSYTX W PT 60 MINUTES: CPT | Performed by: PSYCHOLOGIST

## 2023-11-13 NOTE — PROGRESS NOTES
Start time: 10:03a  End time: 10:56a      The patient was informed of the current need to conduct treatment via telephone or telehealth due to Covid-19 pandemic. Patient consented to the use of the platform that may not be HIPAA compliant. I have confirmed the patient's identity via the following (minimum of three) acceptable identifiers as per  Policy PH-9:   1. Last 4 of social:   2. :   3. Address:    Telephone/Televideo Informed Consent for Psychotherapy was reviewed with the patient as follows:  There are potential benefits and risks of the use of telephone or video-conferencing that differ from in-person sessions. Specifically, the telephone or televideo system we are using may not be HIPAA compliant and may present limits to patient confidentiality. Confidentiality still applies for telepsychology services, and nobody will record the session without your permission. You agree to use the telephone or video-conferencing platform selected for our virtual sessions, and I will explain how to use it.  1)             You need to use a webcam or smartphone during the session.  2)             It is important that you be in a quiet, private space that is free of distractions (including cell phone or other devices) during the session.  3)             It is important to use a secure internet connection rather than public/free Wi-Fi.  4)             It is important to be on time. If you need to cancel or change your tele-appointment, you must notify the psychologist in advance by phone or email.  5)             We need a back-up plan (e.g., phone number where you can be reached) to restart the session or to reschedule it, in the event of technical problems.  6)             We need a safety plan that includes at least one emergency contact and the closest emergency room to your location, in the event of a crisis situation.  7)             If you are not an adult, we need the permission of your parent or legal guardian  (and their contact information) for you to participate in telepsychology sessions.  Understanding and verbal agreement was attested to by the patient.      Reason for care: Anxiety; Depression  Method of therapy: Supportive  Therapy summary: Magaly discussed attempting to increase her exercise and overcoming barriers.     She processed recent stressors (e.g., finances, relationship, her father's health).     She noted that it has been two years since she quit smoking and she is feeling accomplished. She stated that work is going well. She has been practicing assertiveness and has been able to regulate emotion during conflict.     She discussed conflict in a relationship dynamic with a friend.     She continued to process family planning.     She denies SI/HI/AVH. We will follow-up in two weeks.   Identified goals/objectives: Using decision-making skills; Utilize anxiety management strategies for visiting her father; identify workable schedule and routine (continue increasing anxiety); goal follow-up  Response to therapy: Engaged  Treatment plan and process: Continue with above stated tx goals; Psycho-education on anxiety management strategies; Goal setting for health and wellness; Schedule and Routine exploration; Education around decision-making

## 2023-11-30 ENCOUNTER — TELEMEDICINE (OUTPATIENT)
Dept: BEHAVIORAL HEALTH | Facility: CLINIC | Age: 43
End: 2023-11-30
Payer: COMMERCIAL

## 2023-11-30 DIAGNOSIS — F41.1 GAD (GENERALIZED ANXIETY DISORDER): ICD-10-CM

## 2023-11-30 DIAGNOSIS — F33.0 MILD EPISODE OF RECURRENT MAJOR DEPRESSIVE DISORDER (CMS-HCC): ICD-10-CM

## 2023-11-30 PROCEDURE — 90834 PSYTX W PT 45 MINUTES: CPT | Mod: AH,95 | Performed by: PSYCHOLOGIST

## 2023-11-30 NOTE — PROGRESS NOTES
Start time: 10:06a  End time: 10:57a      The patient was informed of the current need to conduct treatment via telephone or telehealth due to Covid-19 pandemic. Patient consented to the use of the platform that may not be HIPAA compliant. I have confirmed the patient's identity via the following (minimum of three) acceptable identifiers as per  Policy PH-9:   1. Last 4 of social:   2. :   3. Address:    Telephone/Televideo Informed Consent for Psychotherapy was reviewed with the patient as follows:  There are potential benefits and risks of the use of telephone or video-conferencing that differ from in-person sessions. Specifically, the telephone or televideo system we are using may not be HIPAA compliant and may present limits to patient confidentiality. Confidentiality still applies for telepsychology services, and nobody will record the session without your permission. You agree to use the telephone or video-conferencing platform selected for our virtual sessions, and I will explain how to use it.  1)             You need to use a webcam or smartphone during the session.  2)             It is important that you be in a quiet, private space that is free of distractions (including cell phone or other devices) during the session.  3)             It is important to use a secure internet connection rather than public/free Wi-Fi.  4)             It is important to be on time. If you need to cancel or change your tele-appointment, you must notify the psychologist in advance by phone or email.  5)             We need a back-up plan (e.g., phone number where you can be reached) to restart the session or to reschedule it, in the event of technical problems.  6)             We need a safety plan that includes at least one emergency contact and the closest emergency room to your location, in the event of a crisis situation.  7)             If you are not an adult, we need the permission of your parent or legal guardian  (and their contact information) for you to participate in telepsychology sessions.  Understanding and verbal agreement was attested to by the patient.      Reason for care: Anxiety; Depression  Method of therapy: Supportive  Therapy summary: Magaly processed getting through her first holiday without her father being home. She stated that she visited him in the nursing facility and felt that it was a good visit. She discussed recent concerns and sadness she has felt while getting rid of some of her father's things.     She reported that she has been exercising more consistently. She reported that she has been using alcohol less, which has created less days with anxiety. She finds that her sleep has improved somewhat and feels this is related to exercise.     She reviewed stress management skills. She described walking as her main form of coping for stress but that she also uses grounding.     She continued to process recent challenges with a friend. We will continue to follow-up and identify healthy ways to communicate concerns in relationships.     She denies SI/HI/AVH. We will follow-up in two weeks.   Identified goals/objectives:  Utilize anxiety management strategies for visiting her father; identify workable schedule and routine (continue increasing anxiety); Continue exercising   Response to therapy: Engaged  Treatment plan and process: Continue with above stated tx goals; Psycho-education on anxiety management strategies; Goal setting for health and wellness; Schedule and Routine exploration; Education around decision-making

## 2023-12-11 ENCOUNTER — TELEMEDICINE (OUTPATIENT)
Dept: BEHAVIORAL HEALTH | Facility: CLINIC | Age: 43
End: 2023-12-11
Payer: COMMERCIAL

## 2023-12-11 DIAGNOSIS — F33.0 MILD EPISODE OF RECURRENT MAJOR DEPRESSIVE DISORDER (CMS-HCC): ICD-10-CM

## 2023-12-11 DIAGNOSIS — F41.1 GAD (GENERALIZED ANXIETY DISORDER): ICD-10-CM

## 2023-12-11 PROCEDURE — 90837 PSYTX W PT 60 MINUTES: CPT | Performed by: PSYCHOLOGIST

## 2023-12-11 NOTE — Clinical Note
Magaly would like to schedule for January 22nd at 2p, February 5th at 11a, and February 19th at 10a. Thank you!

## 2023-12-11 NOTE — PROGRESS NOTES
Start time: 10:05a  End time: 10:58a      The patient was informed of the current need to conduct treatment via telephone or telehealth due to Covid-19 pandemic. Patient consented to the use of the platform that may not be HIPAA compliant. I have confirmed the patient's identity via the following (minimum of three) acceptable identifiers as per  Policy PH-9:   1. Last 4 of social:   2. :   3. Address:    Telephone/Televideo Informed Consent for Psychotherapy was reviewed with the patient as follows:  There are potential benefits and risks of the use of telephone or video-conferencing that differ from in-person sessions. Specifically, the telephone or televideo system we are using may not be HIPAA compliant and may present limits to patient confidentiality. Confidentiality still applies for telepsychology services, and nobody will record the session without your permission. You agree to use the telephone or video-conferencing platform selected for our virtual sessions, and I will explain how to use it.  1)             You need to use a webcam or smartphone during the session.  2)             It is important that you be in a quiet, private space that is free of distractions (including cell phone or other devices) during the session.  3)             It is important to use a secure internet connection rather than public/free Wi-Fi.  4)             It is important to be on time. If you need to cancel or change your tele-appointment, you must notify the psychologist in advance by phone or email.  5)             We need a back-up plan (e.g., phone number where you can be reached) to restart the session or to reschedule it, in the event of technical problems.  6)             We need a safety plan that includes at least one emergency contact and the closest emergency room to your location, in the event of a crisis situation.  7)             If you are not an adult, we need the permission of your parent or legal guardian  "(and their contact information) for you to participate in telepsychology sessions.  Understanding and verbal agreement was attested to by the patient.      Reason for care: Anxiety; Depression  Method of therapy: Supportive  Therapy summary: Magaly reported that she decided to meet from her car so that she would have more privacy.     She stated that she has been \"beating herself up.\" She explored how her mood changes are likely related to her menstrual cycle. Additionally, she has been feeling more fatigued. We explored behavioral changes that she can make around this time of the month. She has been working on depersonalizing the actions and words of others.     She discussed frustrations at work.     She stated that she has not been consistent with her exercise.     She detailed plan for the week and attempting to return to a routine. She stated that she wants to be more active and set goal to exercise for 20-30 min 4x per week. She would like to be more compassionate with herself as well. She explored identifying affirmations and reframing negative self-talk.     She denies SI/HI/AVH. We will follow-up in two weeks.   Identified goals/objectives:  Utilize anxiety management strategies for visiting her father; identify workable schedule and routine (continue increasing anxiety); Continue exercising; reframe negative thinking with writing  Response to therapy: Engaged  Treatment plan and process: Continue with above stated tx goals; Psycho-education on anxiety management strategies; Goal setting for health and wellness; Schedule and Routine exploration; Education around decision-making  "

## 2023-12-18 ENCOUNTER — PATIENT MESSAGE (OUTPATIENT)
Dept: PRIMARY CARE | Facility: CLINIC | Age: 43
End: 2023-12-18
Payer: COMMERCIAL

## 2023-12-18 DIAGNOSIS — R53.83 FATIGUE, UNSPECIFIED TYPE: Primary | ICD-10-CM

## 2023-12-21 ENCOUNTER — LAB (OUTPATIENT)
Dept: LAB | Facility: LAB | Age: 43
End: 2023-12-21
Payer: COMMERCIAL

## 2023-12-21 DIAGNOSIS — E03.9 HYPOTHYROIDISM, UNSPECIFIED TYPE: ICD-10-CM

## 2023-12-21 DIAGNOSIS — R53.83 FATIGUE, UNSPECIFIED TYPE: ICD-10-CM

## 2023-12-21 LAB
ALBUMIN SERPL BCP-MCNC: 4.2 G/DL (ref 3.4–5)
ALP SERPL-CCNC: 73 U/L (ref 33–110)
ALT SERPL W P-5'-P-CCNC: 31 U/L (ref 7–45)
ANION GAP SERPL CALC-SCNC: 11 MMOL/L (ref 10–20)
AST SERPL W P-5'-P-CCNC: 24 U/L (ref 9–39)
BILIRUB SERPL-MCNC: 0.5 MG/DL (ref 0–1.2)
BUN SERPL-MCNC: 12 MG/DL (ref 6–23)
CALCIUM SERPL-MCNC: 9 MG/DL (ref 8.6–10.3)
CHLORIDE SERPL-SCNC: 102 MMOL/L (ref 98–107)
CHOLEST SERPL-MCNC: 242 MG/DL (ref 0–199)
CHOLESTEROL/HDL RATIO: 4.2
CO2 SERPL-SCNC: 28 MMOL/L (ref 21–32)
CREAT SERPL-MCNC: 0.68 MG/DL (ref 0.5–1.05)
ERYTHROCYTE [DISTWIDTH] IN BLOOD BY AUTOMATED COUNT: 12.3 % (ref 11.5–14.5)
GFR SERPL CREATININE-BSD FRML MDRD: >90 ML/MIN/1.73M*2
GLUCOSE SERPL-MCNC: 91 MG/DL (ref 74–99)
HCT VFR BLD AUTO: 40.9 % (ref 36–46)
HDLC SERPL-MCNC: 57.3 MG/DL
HGB BLD-MCNC: 13.6 G/DL (ref 12–16)
LDLC SERPL CALC-MCNC: 148 MG/DL
MCH RBC QN AUTO: 30 PG (ref 26–34)
MCHC RBC AUTO-ENTMCNC: 33.3 G/DL (ref 32–36)
MCV RBC AUTO: 90 FL (ref 80–100)
NON HDL CHOLESTEROL: 185 MG/DL (ref 0–149)
NRBC BLD-RTO: 0 /100 WBCS (ref 0–0)
PLATELET # BLD AUTO: 278 X10*3/UL (ref 150–450)
POTASSIUM SERPL-SCNC: 3.9 MMOL/L (ref 3.5–5.3)
PROT SERPL-MCNC: 7.1 G/DL (ref 6.4–8.2)
RBC # BLD AUTO: 4.53 X10*6/UL (ref 4–5.2)
SODIUM SERPL-SCNC: 137 MMOL/L (ref 136–145)
TRIGL SERPL-MCNC: 183 MG/DL (ref 0–149)
VLDL: 37 MG/DL (ref 0–40)
WBC # BLD AUTO: 6.3 X10*3/UL (ref 4.4–11.3)

## 2023-12-21 PROCEDURE — 80053 COMPREHEN METABOLIC PANEL: CPT

## 2023-12-21 PROCEDURE — 84443 ASSAY THYROID STIM HORMONE: CPT

## 2023-12-21 PROCEDURE — 85027 COMPLETE CBC AUTOMATED: CPT

## 2023-12-21 PROCEDURE — 36415 COLL VENOUS BLD VENIPUNCTURE: CPT

## 2023-12-21 PROCEDURE — 80061 LIPID PANEL: CPT

## 2023-12-22 DIAGNOSIS — E03.9 HYPOTHYROIDISM, UNSPECIFIED TYPE: Primary | ICD-10-CM

## 2023-12-22 LAB — TSH SERPL-ACNC: 3.44 MIU/L (ref 0.44–3.98)

## 2024-01-02 ENCOUNTER — OFFICE VISIT (OUTPATIENT)
Dept: PRIMARY CARE | Facility: CLINIC | Age: 44
End: 2024-01-02
Payer: COMMERCIAL

## 2024-01-02 VITALS
DIASTOLIC BLOOD PRESSURE: 78 MMHG | BODY MASS INDEX: 36.68 KG/M2 | HEIGHT: 68 IN | SYSTOLIC BLOOD PRESSURE: 122 MMHG | WEIGHT: 242 LBS

## 2024-01-02 DIAGNOSIS — E78.00 ELEVATED CHOLESTEROL: ICD-10-CM

## 2024-01-02 DIAGNOSIS — I10 PRIMARY HYPERTENSION: ICD-10-CM

## 2024-01-02 DIAGNOSIS — R20.2 TINGLING: ICD-10-CM

## 2024-01-02 DIAGNOSIS — Z23 NEED FOR VACCINATION: ICD-10-CM

## 2024-01-02 DIAGNOSIS — E66.01 MORBID OBESITY (MULTI): ICD-10-CM

## 2024-01-02 DIAGNOSIS — E03.9 HYPOTHYROIDISM, UNSPECIFIED TYPE: Primary | ICD-10-CM

## 2024-01-02 PROBLEM — E66.9 OBESITY (BMI 30.0-34.9): Status: RESOLVED | Noted: 2023-03-16 | Resolved: 2024-01-02

## 2024-01-02 PROBLEM — J06.9 URTI (ACUTE UPPER RESPIRATORY INFECTION): Status: RESOLVED | Noted: 2023-08-28 | Resolved: 2024-01-02

## 2024-01-02 PROBLEM — E66.811 OBESITY (BMI 30.0-34.9): Status: RESOLVED | Noted: 2023-03-16 | Resolved: 2024-01-02

## 2024-01-02 PROCEDURE — 3008F BODY MASS INDEX DOCD: CPT | Performed by: FAMILY MEDICINE

## 2024-01-02 PROCEDURE — 90471 IMMUNIZATION ADMIN: CPT | Performed by: FAMILY MEDICINE

## 2024-01-02 PROCEDURE — 99214 OFFICE O/P EST MOD 30 MIN: CPT | Performed by: FAMILY MEDICINE

## 2024-01-02 PROCEDURE — 3074F SYST BP LT 130 MM HG: CPT | Performed by: FAMILY MEDICINE

## 2024-01-02 PROCEDURE — 3078F DIAST BP <80 MM HG: CPT | Performed by: FAMILY MEDICINE

## 2024-01-02 PROCEDURE — 90686 IIV4 VACC NO PRSV 0.5 ML IM: CPT | Performed by: FAMILY MEDICINE

## 2024-01-02 PROCEDURE — 1036F TOBACCO NON-USER: CPT | Performed by: FAMILY MEDICINE

## 2024-01-02 RX ORDER — LEVOTHYROXINE SODIUM 75 UG/1
75 TABLET ORAL DAILY
Qty: 30 TABLET | Refills: 11 | Status: SHIPPED | OUTPATIENT
Start: 2024-01-02 | End: 2025-01-01

## 2024-01-02 NOTE — ASSESSMENT & PLAN NOTE
- continue working on weight loss through healthy diet and exercise habits. Encouraged her to start an exercise program

## 2024-01-02 NOTE — ASSESSMENT & PLAN NOTE
- noted with labs an increase, but she had increased her fats to full fat and will make change to low fat options again

## 2024-01-02 NOTE — ASSESSMENT & PLAN NOTE
- at goal, continue Metoprolol 25 mg PO daily though did discuss this can cause some fatigue. She has been on since age 28 and is hesitant to change

## 2024-01-02 NOTE — PROGRESS NOTES
"Chief complaint:   Chief Complaint   Patient presents with    Discuss blood work       HPI:  Magaly Louie is a 43 y.o. female who presents for evaluation of increased fatigue and hair loss. She has had increase in anxiety as well. She has had leg cramping especially after working (stands). She has had some lightheadedness and vision changes. She is currently following with a psychologist. She is taking her medications as prescribed and had labs done mid December 2023 to assess for these sx. She does not that she has had these sx prior to starting her thyroid medication and they improved when she did start.     She is not exercising regularly.    Physical exam:  /78   Ht 1.727 m (5' 8\")   Wt 110 kg (242 lb)   BMI 36.80 kg/m²   General: NAD, well appearing female  Neck: no cervical lymphadenopathy, no thyromegaly  Heart: RRR, no mumur appreciated  Lungs: CTAB, no wheezes, rales, rhonchi    Assessment/Plan   Problem List Items Addressed This Visit       Hypertension     - at goal, continue Metoprolol 25 mg PO daily though did discuss this can cause some fatigue. She has been on since age 28 and is hesitant to change         Hypothyroidism - Primary     - Increase Levothyroxine from 50 mcg to 75 mcg PO daily  - repeat labs to be done in 6-8 weeks         Relevant Medications    levothyroxine (Synthroid, Levoxyl) 75 mcg tablet    Other Relevant Orders    Tsh With Reflex To Free T4 If Abnormal    Morbid obesity (CMS/[facility])     - continue working on weight loss through healthy diet and exercise habits. Encouraged her to start an exercise program         BMI 36.0-36.9,adult    Elevated cholesterol     - noted with labs an increase, but she had increased her fats to full fat and will make change to low fat options again          Other Visit Diagnoses       Tingling        Relevant Orders    Vitamin B12    Folate    Need for vaccination        Relevant Orders    Flu vaccine (IIV4) age 6 months and greater, " preservative free (Completed)        Discussed alternative BP medication trial vs slight adjustment in her thyroid medication which she elects for. Will increase Levothyroxine from 50 mcg to 75 mcg PO daily    Magaly Rivera, DO

## 2024-01-08 ENCOUNTER — TELEMEDICINE (OUTPATIENT)
Dept: BEHAVIORAL HEALTH | Facility: CLINIC | Age: 44
End: 2024-01-08
Payer: COMMERCIAL

## 2024-01-08 DIAGNOSIS — F33.0 MILD EPISODE OF RECURRENT MAJOR DEPRESSIVE DISORDER (CMS-HCC): ICD-10-CM

## 2024-01-08 DIAGNOSIS — F41.1 GAD (GENERALIZED ANXIETY DISORDER): ICD-10-CM

## 2024-01-08 PROCEDURE — 90837 PSYTX W PT 60 MINUTES: CPT | Performed by: PSYCHOLOGIST

## 2024-01-08 NOTE — PROGRESS NOTES
Start time: 10:04a  End time: 10:59a      The patient was informed of the current need to conduct treatment via telephone or telehealth due to Covid-19 pandemic. Patient consented to the use of the platform that may not be HIPAA compliant. I have confirmed the patient's identity via the following (minimum of three) acceptable identifiers as per  Policy PH-9:   1. Last 4 of social:   2. :   3. Address:    Telephone/Televideo Informed Consent for Psychotherapy was reviewed with the patient as follows:  There are potential benefits and risks of the use of telephone or video-conferencing that differ from in-person sessions. Specifically, the telephone or televideo system we are using may not be HIPAA compliant and may present limits to patient confidentiality. Confidentiality still applies for telepsychology services, and nobody will record the session without your permission. You agree to use the telephone or video-conferencing platform selected for our virtual sessions, and I will explain how to use it.  1)             You need to use a webcam or smartphone during the session.  2)             It is important that you be in a quiet, private space that is free of distractions (including cell phone or other devices) during the session.  3)             It is important to use a secure internet connection rather than public/free Wi-Fi.  4)             It is important to be on time. If you need to cancel or change your tele-appointment, you must notify the psychologist in advance by phone or email.  5)             We need a back-up plan (e.g., phone number where you can be reached) to restart the session or to reschedule it, in the event of technical problems.  6)             We need a safety plan that includes at least one emergency contact and the closest emergency room to your location, in the event of a crisis situation.  7)             If you are not an adult, we need the permission of your parent or legal guardian  (and their contact information) for you to participate in telepsychology sessions.  Understanding and verbal agreement was attested to by the patient.      Reason for care: Anxiety; Depression  Method of therapy: Supportive  Therapy summary: Magaly reported that overall her holidays went well. She processed being with her family during this time.     She explored attempting to balance her free time and relaxation with activity and responsibilities. She discussed recent dental and health issues and managing anxiety around these problems.     Pt and I discussed open vs closed notes. She stated that she would like to have her notes blocked and not shared.      She stated that she has not kept up with exercise. She plans to change her goal to exercise 20-30 minutes, 1-2x per week.     She denies SI/HI/AVH. We will follow-up in two weeks.   Identified goals/objectives:  Utilize anxiety management strategies; identify workable schedule and routine (continue increasing activity); Continue exercising; reframe negative thinking with writing  Response to therapy: Engaged  Treatment plan and process: Continue with above stated tx goals; Psycho-education on anxiety management strategies; Goal setting for health and wellness; Schedule and Routine exploration; Education around decision-making

## 2024-01-22 ENCOUNTER — TELEPHONE (OUTPATIENT)
Dept: BEHAVIORAL HEALTH | Facility: CLINIC | Age: 44
End: 2024-01-22

## 2024-01-22 ENCOUNTER — TELEMEDICINE (OUTPATIENT)
Dept: BEHAVIORAL HEALTH | Facility: CLINIC | Age: 44
End: 2024-01-22
Payer: COMMERCIAL

## 2024-01-22 DIAGNOSIS — F41.1 GAD (GENERALIZED ANXIETY DISORDER): ICD-10-CM

## 2024-01-22 DIAGNOSIS — F33.0 MILD EPISODE OF RECURRENT MAJOR DEPRESSIVE DISORDER (CMS-HCC): ICD-10-CM

## 2024-01-22 PROCEDURE — 90837 PSYTX W PT 60 MINUTES: CPT | Performed by: PSYCHOLOGIST

## 2024-01-22 NOTE — PROGRESS NOTES
Start time: 2:02pm  End time: 2:56pm      The patient was informed of the current need to conduct treatment via telephone or telehealth due to Covid-19 pandemic. Patient consented to the use of the platform that may not be HIPAA compliant. I have confirmed the patient's identity via the following (minimum of three) acceptable identifiers as per  Policy PH-9:   1. Last 4 of social:   2. :   3. Address:    Telephone/Televideo Informed Consent for Psychotherapy was reviewed with the patient as follows:  There are potential benefits and risks of the use of telephone or video-conferencing that differ from in-person sessions. Specifically, the telephone or televideo system we are using may not be HIPAA compliant and may present limits to patient confidentiality. Confidentiality still applies for telepsychology services, and nobody will record the session without your permission. You agree to use the telephone or video-conferencing platform selected for our virtual sessions, and I will explain how to use it.  1)             You need to use a webcam or smartphone during the session.  2)             It is important that you be in a quiet, private space that is free of distractions (including cell phone or other devices) during the session.  3)             It is important to use a secure internet connection rather than public/free Wi-Fi.  4)             It is important to be on time. If you need to cancel or change your tele-appointment, you must notify the psychologist in advance by phone or email.  5)             We need a back-up plan (e.g., phone number where you can be reached) to restart the session or to reschedule it, in the event of technical problems.  6)             We need a safety plan that includes at least one emergency contact and the closest emergency room to your location, in the event of a crisis situation.  7)             If you are not an adult, we need the permission of your parent or legal guardian  (and their contact information) for you to participate in telepsychology sessions.  Understanding and verbal agreement was attested to by the patient.      Reason for care: Anxiety; Depression  Method of therapy: Supportive  Therapy summary: Magaly reported that she is working on increasing her exercise. She has been walking on the treadmill. Additionally, she stated that she has been keeping up with various goals (e.g., cleaning out drawers/closets, printing out recipes to make a cookbook).     She stated that she recently saw the eye doctor and discovered that she her glasses prescription is too strong, which is impacting her fatigue and blurry vision. She discussed how these issues impact her sense of self.     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     Goals: Continue exercise; spending time with close friends and socializing; self-care     She denies SI/HI/AVH. We will follow-up in two weeks.   Identified goals/objectives:  Continue engaging in self-care activities; Utilize anxiety management strategies; identify workable schedule and routine (continue increasing activity); Continue exercising; reframe negative thinking with writing  Response to therapy: Engaged  Treatment plan and process: Continue with above stated tx goals; Psycho-education on anxiety management strategies; Goal setting for health and wellness; Schedule and Routine exploration; Education around decision-making

## 2024-01-22 NOTE — TELEPHONE ENCOUNTER
----- Message from Lynne Redding PsyD sent at 1/22/2024  2:59 PM EST -----  Magaly would like to change her appointment on February 8th from 3p to 9am. She would also like to schedule for March 4th at 10am and March 21st at 10am. Thank you!

## 2024-01-22 NOTE — Clinical Note
Magaly would like to change her appointment on February 8th from 3p to 9am. She would also like to schedule for March 4th at 10am and March 21st at 10am. Thank you!

## 2024-02-05 ENCOUNTER — APPOINTMENT (OUTPATIENT)
Dept: BEHAVIORAL HEALTH | Facility: CLINIC | Age: 44
End: 2024-02-05
Payer: COMMERCIAL

## 2024-02-08 ENCOUNTER — TELEMEDICINE (OUTPATIENT)
Dept: BEHAVIORAL HEALTH | Facility: CLINIC | Age: 44
End: 2024-02-08

## 2024-02-08 DIAGNOSIS — F33.0 MILD EPISODE OF RECURRENT MAJOR DEPRESSIVE DISORDER (CMS-HCC): ICD-10-CM

## 2024-02-08 DIAGNOSIS — F41.1 GAD (GENERALIZED ANXIETY DISORDER): ICD-10-CM

## 2024-02-08 PROCEDURE — 90834 PSYTX W PT 45 MINUTES: CPT | Mod: AH,95 | Performed by: PSYCHOLOGIST

## 2024-02-08 PROCEDURE — 1036F TOBACCO NON-USER: CPT | Performed by: PSYCHOLOGIST

## 2024-02-08 PROCEDURE — 3008F BODY MASS INDEX DOCD: CPT | Performed by: PSYCHOLOGIST

## 2024-02-08 NOTE — PROGRESS NOTES
Start time: 9:04am  End time: 9:55am      The patient was informed of the current need to conduct treatment via telephone or telehealth due to Covid-19 pandemic. Patient consented to the use of the platform that may not be HIPAA compliant. I have confirmed the patient's identity via the following (minimum of three) acceptable identifiers as per  Policy PH-9:   1. Last 4 of social:   2. :   3. Address:    Telephone/Televideo Informed Consent for Psychotherapy was reviewed with the patient as follows:  There are potential benefits and risks of the use of telephone or video-conferencing that differ from in-person sessions. Specifically, the telephone or televideo system we are using may not be HIPAA compliant and may present limits to patient confidentiality. Confidentiality still applies for telepsychology services, and nobody will record the session without your permission. You agree to use the telephone or video-conferencing platform selected for our virtual sessions, and I will explain how to use it.  1)             You need to use a webcam or smartphone during the session.  2)             It is important that you be in a quiet, private space that is free of distractions (including cell phone or other devices) during the session.  3)             It is important to use a secure internet connection rather than public/free Wi-Fi.  4)             It is important to be on time. If you need to cancel or change your tele-appointment, you must notify the psychologist in advance by phone or email.  5)             We need a back-up plan (e.g., phone number where you can be reached) to restart the session or to reschedule it, in the event of technical problems.  6)             We need a safety plan that includes at least one emergency contact and the closest emergency room to your location, in the event of a crisis situation.  7)             If you are not an adult, we need the permission of your parent or legal guardian  (and their contact information) for you to participate in telepsychology sessions.  Understanding and verbal agreement was attested to by the patient.      Reason for care: Anxiety; Depression  Method of therapy: Supportive  Therapy summary: Magaly processed her father's health. He recently had surgery and she discussed her reactions to this. She described high anxiety and how she is coping. She noted that she utilized support from her family while also considering what is within her control vs what is not.     She discussed self-reflection she has engaged in order to help calm recent reactions of anger.     She stated that she reached out to her close friend for socialization and was glad she did. She has been attempting to exercise and meal planning but finding this difficult. We explored acceptance and goal setting.     Goals: Continue exercise; spending time with close friends and socializing; self-care     She denies SI/HI/AVH. We will follow-up in two weeks.   Identified goals/objectives:  Continue engaging in self-care activities; Utilize anxiety management strategies; identify workable schedule and routine (continue increasing activity); Continue exercising; reframe negative thinking with writing  Response to therapy: Engaged  Treatment plan and process: Continue with above stated tx goals; Psycho-education on anxiety management strategies; Goal setting for health and wellness; Schedule and Routine exploration; Education around decision-making

## 2024-02-15 ENCOUNTER — LAB (OUTPATIENT)
Dept: LAB | Facility: LAB | Age: 44
End: 2024-02-15
Payer: COMMERCIAL

## 2024-02-15 DIAGNOSIS — E03.9 HYPOTHYROIDISM, UNSPECIFIED TYPE: ICD-10-CM

## 2024-02-15 DIAGNOSIS — R20.2 TINGLING: ICD-10-CM

## 2024-02-15 LAB
FOLATE SERPL-MCNC: >22.3 NG/ML
TSH SERPL-ACNC: 2.36 MIU/L (ref 0.44–3.98)
VIT B12 SERPL-MCNC: 312 PG/ML (ref 211–911)

## 2024-02-15 PROCEDURE — 82746 ASSAY OF FOLIC ACID SERUM: CPT

## 2024-02-15 PROCEDURE — 36415 COLL VENOUS BLD VENIPUNCTURE: CPT

## 2024-02-15 PROCEDURE — 82607 VITAMIN B-12: CPT

## 2024-02-15 PROCEDURE — 84443 ASSAY THYROID STIM HORMONE: CPT

## 2024-02-19 ENCOUNTER — TELEMEDICINE (OUTPATIENT)
Dept: BEHAVIORAL HEALTH | Facility: CLINIC | Age: 44
End: 2024-02-19
Payer: COMMERCIAL

## 2024-02-19 DIAGNOSIS — F41.1 GAD (GENERALIZED ANXIETY DISORDER): ICD-10-CM

## 2024-02-19 DIAGNOSIS — F33.0 MILD EPISODE OF RECURRENT MAJOR DEPRESSIVE DISORDER (CMS-HCC): ICD-10-CM

## 2024-02-19 PROBLEM — M79.673 PAIN OF FOOT: Status: ACTIVE | Noted: 2024-02-19

## 2024-02-19 PROBLEM — R21 RASH: Status: RESOLVED | Noted: 2024-02-19 | Resolved: 2024-02-19

## 2024-02-19 PROBLEM — R52 MECHANICAL PAIN: Status: ACTIVE | Noted: 2024-02-19

## 2024-02-19 PROBLEM — N91.2 AMENORRHEA: Status: ACTIVE | Noted: 2024-02-19

## 2024-02-19 PROBLEM — R05.9 COUGH: Status: RESOLVED | Noted: 2024-02-19 | Resolved: 2024-02-19

## 2024-02-19 PROBLEM — Z20.822 EXPOSURE TO SEVERE ACUTE RESPIRATORY SYNDROME CORONAVIRUS 2 (SARS-COV-2): Status: RESOLVED | Noted: 2024-02-19 | Resolved: 2024-02-19

## 2024-02-19 PROBLEM — M99.00 SOMATIC DYSFUNCTION OF HEAD REGION: Status: ACTIVE | Noted: 2024-02-19

## 2024-02-19 PROBLEM — R47.89 WORD FINDING DIFFICULTY: Status: ACTIVE | Noted: 2024-02-19

## 2024-02-19 PROBLEM — R51.9 HEADACHE: Status: RESOLVED | Noted: 2024-02-19 | Resolved: 2024-02-19

## 2024-02-19 PROCEDURE — 1036F TOBACCO NON-USER: CPT | Performed by: PSYCHOLOGIST

## 2024-02-19 PROCEDURE — 3008F BODY MASS INDEX DOCD: CPT | Performed by: PSYCHOLOGIST

## 2024-02-19 PROCEDURE — 90837 PSYTX W PT 60 MINUTES: CPT | Performed by: PSYCHOLOGIST

## 2024-02-19 NOTE — PROGRESS NOTES
Start time: 10:05am  End time: 10:59am      The patient was informed of the current need to conduct treatment via telephone or telehealth due to Covid-19 pandemic. Patient consented to the use of the platform that may not be HIPAA compliant. I have confirmed the patient's identity via the following (minimum of three) acceptable identifiers as per  Policy PH-9:   1. Last 4 of social:   2. :   3. Address:    Telephone/Televideo Informed Consent for Psychotherapy was reviewed with the patient as follows:  There are potential benefits and risks of the use of telephone or video-conferencing that differ from in-person sessions. Specifically, the telephone or televideo system we are using may not be HIPAA compliant and may present limits to patient confidentiality. Confidentiality still applies for telepsychology services, and nobody will record the session without your permission. You agree to use the telephone or video-conferencing platform selected for our virtual sessions, and I will explain how to use it.  1)             You need to use a webcam or smartphone during the session.  2)             It is important that you be in a quiet, private space that is free of distractions (including cell phone or other devices) during the session.  3)             It is important to use a secure internet connection rather than public/free Wi-Fi.  4)             It is important to be on time. If you need to cancel or change your tele-appointment, you must notify the psychologist in advance by phone or email.  5)             We need a back-up plan (e.g., phone number where you can be reached) to restart the session or to reschedule it, in the event of technical problems.  6)             We need a safety plan that includes at least one emergency contact and the closest emergency room to your location, in the event of a crisis situation.  7)             If you are not an adult, we need the permission of your parent or legal  guardian (and their contact information) for you to participate in telepsychology sessions.  Understanding and verbal agreement was attested to by the patient.    Magaly reported that she would like her notes to remain blocked at this time.     Reason for care: Anxiety; Depression  Method of therapy: Supportive  Therapy summary: Magaly stated that overall things have been going well. She discussed managing work conflict well and not allowing it to impact her mood and anxiety.     She discussed health related issues that she is experiencing. She discussed emotional changes she experiences with PMS including intense sadness. We reviewed skill of opposite action. She stated that she is meeting with her PCP tomorrow and plans to discuss some of these symptoms.     She processed recent emotions around her birthday and spending time with a friend.     Goals: Reading for intuitive eating at least two chapters per week; continuing walking     She denies SI/HI/AVH. We will follow-up in two weeks.   Identified goals/objectives:  Continue engaging in self-care activities; Utilize anxiety management strategies; identify workable schedule and routine (continue increasing activity); Continue exercising; reframe negative thinking with writing  Response to therapy: Engaged  Treatment plan and process: Continue with above stated tx goals; Psycho-education on anxiety management strategies; Goal setting for health and wellness; Schedule and Routine exploration; Education around decision-making

## 2024-02-20 ENCOUNTER — OFFICE VISIT (OUTPATIENT)
Dept: PRIMARY CARE | Facility: CLINIC | Age: 44
End: 2024-02-20
Payer: COMMERCIAL

## 2024-02-20 VITALS
WEIGHT: 246.91 LBS | SYSTOLIC BLOOD PRESSURE: 124 MMHG | TEMPERATURE: 96.6 F | DIASTOLIC BLOOD PRESSURE: 76 MMHG | BODY MASS INDEX: 37.54 KG/M2

## 2024-02-20 DIAGNOSIS — R42 LIGHTHEADEDNESS: ICD-10-CM

## 2024-02-20 DIAGNOSIS — R94.31 ABNORMAL EKG: ICD-10-CM

## 2024-02-20 DIAGNOSIS — I10 PRIMARY HYPERTENSION: ICD-10-CM

## 2024-02-20 DIAGNOSIS — R12 HEARTBURN: ICD-10-CM

## 2024-02-20 DIAGNOSIS — R19.5 LOOSE STOOLS: ICD-10-CM

## 2024-02-20 DIAGNOSIS — R14.0 BLOATING: Primary | ICD-10-CM

## 2024-02-20 PROCEDURE — 1036F TOBACCO NON-USER: CPT | Performed by: FAMILY MEDICINE

## 2024-02-20 PROCEDURE — 3078F DIAST BP <80 MM HG: CPT | Performed by: FAMILY MEDICINE

## 2024-02-20 PROCEDURE — 3008F BODY MASS INDEX DOCD: CPT | Performed by: FAMILY MEDICINE

## 2024-02-20 PROCEDURE — 3074F SYST BP LT 130 MM HG: CPT | Performed by: FAMILY MEDICINE

## 2024-02-20 PROCEDURE — 99214 OFFICE O/P EST MOD 30 MIN: CPT | Performed by: FAMILY MEDICINE

## 2024-02-20 PROCEDURE — 93000 ELECTROCARDIOGRAM COMPLETE: CPT | Performed by: FAMILY MEDICINE

## 2024-02-20 NOTE — PROGRESS NOTES
Chief complaint:   Chief Complaint   Patient presents with    Follow-up     Fatigue and tingling        HPI:  Magaly Louie is a 44 y.o. female who presents for evaluation of fatigue and tingling.     She did go to the eye doctor and had a prescription change.     She is still getting lightheaded and tired but has not had the tingling as much since the change in Levothyroxine dose    She feels like she could pass out but she has never passed out. No syncopal hx. No history of this in the past. This is happening daily and a couple times daily on average. She often feels it around 6 or 7 but it can be random and started a few mo ago. She denies any chest pain but has a family hx of a fib in mom and dad. She took her BP recently when it happened and her BP was 153/93. She does not note timing to eating or having not eaten for a while. She has been told she has a leaky heart when she had cardiac work up age 28. She states at that time she was evaluated by a cardiologist and Metoprolol was started as she had a cousin who  suddenly around that time. She has been told she has a murmur before, mainly when sick.    No sex drive  She has bad PMS prior to her periods  She follows with therapist  She continues to have bloating, losse stool, burping    She is unsure if some of these sx started with increased Lexapro but also has concerns with increased anxiety. She has not tolerated Wellbutrin in the past due to increased anxiety.     Physical exam:  /76   Temp 35.9 °C (96.6 °F)   Wt 112 kg (246 lb 14.6 oz)   BMI 37.54 kg/m²   Orthostatics:   Lying down 5 min:  /70 Pulse 67  Standing 1 minute: /80 Pulse 66  Standing 3 min: /76 Pulse 72  General: NAD, well appearing female  Heart: RRR, no mumur appreciated  Lungs: CTAB, no wheezes, rales, rhonchi  Abdomen: soft, non tender, normoactive BS, no organomegaly  Extremities: No LE edema    Assessment/Plan   Problem List Items Addressed This Visit        Heartburn    Relevant Orders    Referral to Gastroenterology    Hypertension    Relevant Orders    Referral to Cardiology    Transthoracic Echo (TTE) Complete     Other Visit Diagnoses       Bloating    -  Primary    Relevant Orders    Referral to Gastroenterology    Loose stools        Relevant Orders    Referral to Gastroenterology    Lightheadedness        Relevant Orders    ECG 12 lead (Clinic Performed) (Completed)    Referral to Cardiology    Transthoracic Echo (TTE) Complete    Abnormal EKG        Relevant Orders    Referral to Cardiology    Transthoracic Echo (TTE) Complete        Orthostatic negative. No murmur appreciated today but EKG was interpreted and performed today and cardiology referral was placed. ECHO was ordered as her reported hx and EKG findings. Labs recently performed. Discussed Holter monitoring but will defer to cardiology for further testing.     GI referral placed due to her persistent GI sx despite lactose free diet and PPI use.     As she is unsure if some of these sx are associated with increased Lexapro dose, we discussed trial of decreasing this but as she is concerned with increasing anxiety will hold off on this change at this time.    Magaly Rivera, DO

## 2024-02-22 ENCOUNTER — OFFICE VISIT (OUTPATIENT)
Dept: CARDIOLOGY | Facility: CLINIC | Age: 44
End: 2024-02-22
Payer: COMMERCIAL

## 2024-02-22 VITALS
BODY MASS INDEX: 37.19 KG/M2 | SYSTOLIC BLOOD PRESSURE: 139 MMHG | WEIGHT: 245.37 LBS | HEART RATE: 76 BPM | HEIGHT: 68 IN | OXYGEN SATURATION: 99 % | DIASTOLIC BLOOD PRESSURE: 99 MMHG

## 2024-02-22 DIAGNOSIS — I10 PRIMARY HYPERTENSION: ICD-10-CM

## 2024-02-22 DIAGNOSIS — R42 LIGHTHEADEDNESS: Primary | ICD-10-CM

## 2024-02-22 DIAGNOSIS — R94.31 ABNORMAL EKG: ICD-10-CM

## 2024-02-22 PROCEDURE — 3080F DIAST BP >= 90 MM HG: CPT | Performed by: INTERNAL MEDICINE

## 2024-02-22 PROCEDURE — 99204 OFFICE O/P NEW MOD 45 MIN: CPT | Performed by: INTERNAL MEDICINE

## 2024-02-22 PROCEDURE — 1036F TOBACCO NON-USER: CPT | Performed by: INTERNAL MEDICINE

## 2024-02-22 PROCEDURE — 3075F SYST BP GE 130 - 139MM HG: CPT | Performed by: INTERNAL MEDICINE

## 2024-02-22 PROCEDURE — 99214 OFFICE O/P EST MOD 30 MIN: CPT | Performed by: INTERNAL MEDICINE

## 2024-02-22 PROCEDURE — 93005 ELECTROCARDIOGRAM TRACING: CPT | Performed by: INTERNAL MEDICINE

## 2024-02-22 PROCEDURE — 3008F BODY MASS INDEX DOCD: CPT | Performed by: INTERNAL MEDICINE

## 2024-02-22 PROCEDURE — 93010 ELECTROCARDIOGRAM REPORT: CPT | Performed by: INTERNAL MEDICINE

## 2024-02-22 ASSESSMENT — ENCOUNTER SYMPTOMS
ALLERGIC/IMMUNOLOGIC NEGATIVE: 1
GASTROINTESTINAL NEGATIVE: 1
PSYCHIATRIC NEGATIVE: 1
ENDOCRINE NEGATIVE: 1
CARDIOVASCULAR NEGATIVE: 1
NEUROLOGICAL NEGATIVE: 1
RESPIRATORY NEGATIVE: 1
CONSTITUTIONAL NEGATIVE: 1
MUSCULOSKELETAL NEGATIVE: 1
HEMATOLOGIC/LYMPHATIC NEGATIVE: 1
EYES NEGATIVE: 1

## 2024-02-22 ASSESSMENT — PAIN SCALES - GENERAL: PAINLEVEL: 0-NO PAIN

## 2024-02-22 NOTE — PROGRESS NOTES
Preventive Cardiology Clinic Note    Reason for Visit: Lightheadedness and abnormal EKG  Referring Clinician: Miguel     History of Present Illness: Magaly oLuie is a 44 y.o. female with no prior cardiovascular history who was referred by her PCP for evaluation of an abnormal EKG and periodic lightheadedness and elevated blood pressure/hypertension.  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  The feeling comes on suddenly stays for few minutes and then resolves spontaneously.  There is no specific triggering or mitigating factors and no specific worsening factor.  It is not associated with chest pain, shortness of breath, or palpitations.  She has not had any syncope either.  There is no association with exertion and she does not have any exertional symptoms.  She is a former smoker.  She had a first cousin who  suddenly when she was 28 years old and she had an echocardiogram at that time which may have shown a leaky valve but was otherwise normal.  There is no other history of sudden cardiac death in any immediate relatives.  She has a history of hypertension and is just on metoprolol currently.  She is not sure what her blood pressure is in the ambulatory setting at home.  Orthostatic vital signs were done at her PCPs office and were normal.    Past Medical History:  She has a past medical history of Acute upper respiratory infection, unspecified (2020), Burning with urination (2023), Candidiasis, unspecified, Cough (2024), COVID-19 (2021), Dysuria (2023), Exposure to severe acute respiratory syndrome coronavirus 2 (SARS-CoV-2) (2024), Headache (2024), Major depressive disorder, single episode, unspecified (2021), Other conditions influencing health status, Other specified abnormal findings of blood chemistry (10/03/2018), Other specified health status (2020), Other  speech disturbances (02/27/2019), Other visual disturbances (03/01/2019), Personal history of other diseases of the respiratory system (07/01/2019), Personal history of other specified conditions (03/01/2019), Rash (02/19/2024), Segmental and somatic dysfunction of cervical region (03/01/2019), Segmental and somatic dysfunction of head region (03/01/2019), Strain of muscle, fascia and tendon of lower back, initial encounter (01/14/2020), Vaginal burning (06/26/2023), and Vaginitis (10/26/2022).    Past Surgical History:  She has no past surgical history on file.    Social History:  She reports that she has quit smoking. Her smoking use included cigarettes. She has a 8.50 pack-year smoking history. She has been exposed to tobacco smoke. She has never used smokeless tobacco. She reports current alcohol use of about 2.0 standard drinks of alcohol per week. She reports that she does not use drugs.    Family History:  Family History   Problem Relation Name Age of Onset    COPD Mother Renetta     Anxiety disorder Mother Renetta     Sleep apnea Mother Renetta     Thyroid disease Mother Renetta     Diabetes Mother Renetta     Sleep apnea Father Mauricio     Diabetes Father Mauricio     Dementia Father Mauricio     Hypertension Sister Marielena        Allergies:  Penicillins, Bee pollen, Erythromycin, House dust, Oseltamivir, Oseltamivir phosphate, and Tree and shrub pollen    Outpatient Medications:  Current Outpatient Medications   Medication Instructions    escitalopram (LEXAPRO) 20 mg, oral, Daily    fluticasone (Flonase) 50 mcg/actuation nasal spray 2 sprays, nasal, Daily    levothyroxine (SYNTHROID, LEVOXYL) 75 mcg, oral, Daily    loratadine (CLARITIN) 10 mg, oral, Daily    metoprolol succinate XL (TOPROL-XL) 25 mg, oral, Daily    pantoprazole (PROTONIX) 20 mg, oral, Daily before breakfast, Do not crush, chew, or split.    prenatal no115/iron/folic acid (PRENATAL 19 ORAL) oral       Review of Systems:  Review of Systems  "  Constitutional: Negative.   HENT: Negative.     Eyes: Negative.    Cardiovascular: Negative.    Respiratory: Negative.     Endocrine: Negative.    Hematologic/Lymphatic: Negative.    Skin: Negative.    Musculoskeletal: Negative.    Gastrointestinal: Negative.    Genitourinary: Negative.    Neurological: Negative.    Psychiatric/Behavioral: Negative.     Allergic/Immunologic: Negative.        Last Recorded Vitals:  Vitals:    02/22/24 1554   BP: (!) 139/99   BP Location: Left arm   Patient Position: Sitting   Pulse: 76   SpO2: 99%   Weight: 111 kg (245 lb 6 oz)   Height: 1.727 m (5' 8\")       Physical Examination:  General: Well appearing, well-nourished, in no acute distress.  HEENT: Normocephalic atraumatic, pupils equal and reactive to light, extraocular muscles intact, no conjunctival injection, oropharynx clear without exudates.  Neck: Normal carotid arterial pulses, no arterial bruits, no thyromegaly.  Cardiac: Regular rhythm and normal heart rate.  S1, S2 present and normal.  No murmurs, rubs, or gallops.  PMI is nondisplaced. Jugular venous pulsations are normal.  Pulmonary: Normal breath sounds, no increased work of breathing, no wheezes or crackles.  GI: Normal bowel sounds, abdominal aorta not enlarged, no hepatosplenomegaly, no abdominal bruits.  Lower extremities: No cyanosis, clubbing, or edema.  No xanthelasma present. Normal distal pulses.  Skin: Skin intact. No significant rashes or lesions present.  Neuro: Alert and oriented x 3, normal attention and cognition, no focal motor or sensory neurologic deficits.  Psych: Normal affect and mood.  Musculoskeletal: Normal gait normal muscle tone.    Laboratory Studies:  Lab Results   Component Value Date    GLUCOSE 91 12/21/2023    CALCIUM 9.0 12/21/2023     12/21/2023    K 3.9 12/21/2023    CO2 28 12/21/2023     12/21/2023    BUN 12 12/21/2023    CREATININE 0.68 12/21/2023     Lab Results   Component Value Date    ALT 31 12/21/2023    AST 24 " 12/21/2023    ALKPHOS 73 12/21/2023    BILITOT 0.5 12/21/2023         Lab Results   Component Value Date    CHOL 242 (H) 12/21/2023    CHOL 205 (H) 06/23/2022     Lab Results   Component Value Date    HDL 57.3 12/21/2023    HDL 54.0 06/23/2022     Lab Results   Component Value Date    LDLCALC 148 (H) 12/21/2023     Lab Results   Component Value Date    TRIG 183 (H) 12/21/2023    TRIG 115 06/23/2022       Cardiology Tests:  ECG:  ECG 12 lead (Clinic Performed) 2/22/2024  ECG done in the office today shows normal sinus rhythm, ventricular rate 75 bpm, nonspecific T wave changes in V2 and V3, otherwise normal ECG.    Echo:Pending - to be performed.    The 10-year ASCVD risk score (Robert GARCIA, et al., 2019) is: 1.5%    Values used to calculate the score:      Age: 44 years      Sex: Female      Is Non- : No      Diabetic: No      Tobacco smoker: No      Systolic Blood Pressure: 139 mmHg      Is BP treated: Yes      HDL Cholesterol: 57.3 mg/dL      Total Cholesterol: 242 mg/dL      Assessment and Plan:  Problem List Items Addressed This Visit          Cardiac and Vasculature    Hypertension    Relevant Orders    24 Hour Blood Pressure Monitor     Other Visit Diagnoses       Lightheadedness    -  Primary    Relevant Orders    Transthoracic Echo Complete    Abnormal EKG        Relevant Orders    ECG 12 Lead    Transthoracic Echo Complete          Magaly Louie is a 44 y.o. female with no prior cardiovascular history who was referred by her PCP for evaluation of an abnormal EKG and periodic lightheadedness and elevated blood pressure/hypertension.  In my opinion based upon her history and physical exam her lightheadedness is most likely not related to any underlying cardiac problem.  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  Her EKG is mildly abnormal but nonspecific.  I agree with further workup with an  echocardiogram.  Her blood pressure is also elevated in the office today and she is unsure what is in the ambulatory setting so I recommended 24-hour ambulatory blood pressure monitor for more objective assessment to determine treatment decisions.  I will see her again after above testing is completed here in the office to go over the results and provide further recommendations.  Please do not hesitate to call or contact me with questions or concerns.    Joseph Randolph MD, FAYISSEL, FACC  Director,  Center [facility] Cardiovascular Prevention  Director,  [facility]  Associate Professor of Medicine  [employer]

## 2024-02-28 LAB
ATRIAL RATE: 75 BPM
P AXIS: 26 DEGREES
P OFFSET: 191 MS
P ONSET: 136 MS
PR INTERVAL: 164 MS
Q ONSET: 218 MS
QRS COUNT: 12 BEATS
QRS DURATION: 88 MS
QT INTERVAL: 386 MS
QTC CALCULATION(BAZETT): 431 MS
QTC FREDERICIA: 415 MS
R AXIS: 49 DEGREES
T AXIS: 41 DEGREES
T OFFSET: 411 MS
VENTRICULAR RATE: 75 BPM

## 2024-03-07 ENCOUNTER — HOSPITAL ENCOUNTER (OUTPATIENT)
Dept: CARDIOLOGY | Facility: CLINIC | Age: 44
Discharge: HOME | End: 2024-03-07
Payer: COMMERCIAL

## 2024-03-07 VITALS — HEIGHT: 68 IN | BODY MASS INDEX: 37.13 KG/M2 | WEIGHT: 245 LBS

## 2024-03-07 DIAGNOSIS — R42 LIGHTHEADEDNESS: ICD-10-CM

## 2024-03-07 DIAGNOSIS — R94.31 ABNORMAL EKG: ICD-10-CM

## 2024-03-07 PROCEDURE — 93306 TTE W/DOPPLER COMPLETE: CPT

## 2024-03-07 PROCEDURE — 93306 TTE W/DOPPLER COMPLETE: CPT | Performed by: INTERNAL MEDICINE

## 2024-03-10 LAB
AORTIC VALVE MEAN GRADIENT: 4.1 MMHG
AORTIC VALVE PEAK VELOCITY: 1.44 M/S
AV PEAK GRADIENT: 8.3 MMHG
AVA (PEAK VEL): 1.99 CM2
AVA (VTI): 2.21 CM2
EJECTION FRACTION APICAL 4 CHAMBER: 59.6
EJECTION FRACTION: 59 %
LEFT ATRIUM VOLUME AREA LENGTH INDEX BSA: 25.2 ML/M2
LEFT VENTRICLE INTERNAL DIMENSION DIASTOLE: 5.22 CM (ref 3.5–6)
LEFT VENTRICULAR OUTFLOW TRACT DIAMETER: 2.15 CM
MITRAL VALVE E/A RATIO: 1.05
RIGHT VENTRICLE FREE WALL PEAK S': 13 CM/S
TRICUSPID ANNULAR PLANE SYSTOLIC EXCURSION: 2.3 CM

## 2024-03-14 ENCOUNTER — HOSPITAL ENCOUNTER (OUTPATIENT)
Dept: CARDIOLOGY | Facility: CLINIC | Age: 44
Discharge: HOME | End: 2024-03-14
Payer: COMMERCIAL

## 2024-03-14 DIAGNOSIS — I10 PRIMARY HYPERTENSION: ICD-10-CM

## 2024-03-14 PROCEDURE — 93790 AMBL BP MNTR W/SW I&R: CPT | Performed by: INTERNAL MEDICINE

## 2024-03-14 PROCEDURE — 93786 AMBL BP MNTR W/SW REC ONLY: CPT

## 2024-03-18 NOTE — ADDENDUM NOTE
Encounter addended by: Joseph Randolph MD on: 3/18/2024 2:41 PM   Actions taken: Clinical Note Signed, Charge Capture section accepted

## 2024-03-18 NOTE — PROCEDURES
24-hour Ambulatory Blood Pressure Monitor Report  [facility] [place]    Device: [employer] Elsa ABPM [id number]    Period of Monitoring: From 3/14/2024, 9:59 AM to 3/15/2024, 1:00 PM.     Successful Readings: 54 (83 %)     Indication:   Suspected white coat hypertension    Current Medications:  Current Outpatient Medications on File Prior to Encounter   Medication Sig Dispense Refill    escitalopram (Lexapro) 20 mg tablet Take 1 tablet (20 mg) by mouth once daily. 90 tablet 3    fluticasone (Flonase) 50 mcg/actuation nasal spray Administer 2 sprays into affected nostril(s) once daily.      levothyroxine (Synthroid, Levoxyl) 75 mcg tablet Take 1 tablet (75 mcg) by mouth once daily. 30 tablet 11    loratadine (Claritin) 10 mg tablet Take 1 tablet (10 mg) by mouth once daily.      metoprolol succinate XL (Toprol-XL) 25 mg 24 hr tablet Take 1 tablet (25 mg) by mouth once daily. 90 tablet 3    pantoprazole (Protonix) 20 mg EC tablet Take 1 tablet (20 mg) by mouth once daily in the morning. Take before meals. Do not crush, chew, or split. 30 tablet 11    prenatal no115/iron/folic acid (PRENATAL 19 ORAL) Take by mouth.       No current facility-administered medications on file prior to encounter.          Findings (Please see attached report):   Average ambulatory blood pressure was 130/88 mmHg with a heart rate of 72 beats per minute.     The highest SBP and DBP was 154 at 8:43 and 108 at 22:00.     The lowest SBP and DBP was 110 at 15:20 and 61 at 4:00.     From 8 a.m. to 11 p.m., average blood pressure was 131/91 mmHg with average heart rate of 72 beats per minute.     From 11 p.m. to 8 a.m., average blood pressure was 126/71 mmHg with a heart rate of 71 beats per minute.     Patient went to bed at unknown and woke up at unknown.    Patient reported symptoms: None     Impression: Average 24-hour ambulatory blood pressure of 130/83 mmHg indicates stage 1 hypertension. Patient has abnormal  nocturnal BP dipping. Mild white coat effect is seen at study start and end.    Recommendation: Given ASCVD risk <10% recommend non-pharmacological therapy for stage 1 hypertension.      Joseph Randolph MD, FA, [facility]  Director,  Center for Cardiovascular Prevention  [place] [facility] [place]  [facility]       Recommended standards for normal ambulatory blood pressure values which are proposed to be equivalent to clinic BP of <130/80 mmHg include: daytime BP <130/80 mg Hg, nighttime BP <110/65 mm Hg, and 24 hour BP <125/75 mm Hg.   (Landon PK, et al.2017 High Blood Pressure Clinical Practice Guideline, Hypertension. 2017).     The clinical criteria for white coat hypertension are defined as:   Office blood pressure =130/80 mm Hg but <160/100 mm Hg after three month trial of lifestyle modification and suspected white coat hypertension with daytime ABPM or HBPM blood pressure <130/80 mm Hg.     The clinical criteria for masked hypertension are defined as:  Office blood pressure of 120 - 129/<80 mm Hg after three month trial of lifestyle modification and suspected masked hypertension with daytime ABPM or HBPM blood pressure =130/80 mm Hg.   (Measurement of Blood Pressure in Humans, A Scientific Statement from the American Heart Association, May 2019).

## 2024-03-21 ENCOUNTER — TELEMEDICINE (OUTPATIENT)
Dept: BEHAVIORAL HEALTH | Facility: CLINIC | Age: 44
End: 2024-03-21
Payer: COMMERCIAL

## 2024-03-21 DIAGNOSIS — F41.1 GAD (GENERALIZED ANXIETY DISORDER): ICD-10-CM

## 2024-03-21 DIAGNOSIS — F33.0 MILD EPISODE OF RECURRENT MAJOR DEPRESSIVE DISORDER (CMS-HCC): ICD-10-CM

## 2024-03-21 NOTE — PROGRESS NOTES
Start time: 10:05am  End time: 10:53am    The patient was informed of the current need to conduct treatment via telephone or telehealth due to Covid-19 pandemic. Patient consented to the use of the platform that may not be HIPAA compliant. I have confirmed the patient's identity via the following (minimum of three) acceptable identifiers as per  Policy PH-9:   1. Last 4 of social:   2. :   3. Address:    Telephone/Televideo Informed Consent for Psychotherapy was reviewed with the patient as follows:  There are potential benefits and risks of the use of telephone or video-conferencing that differ from in-person sessions. Specifically, the telephone or televideo system we are using may not be HIPAA compliant and may present limits to patient confidentiality. Confidentiality still applies for telepsychology services, and nobody will record the session without your permission. You agree to use the telephone or video-conferencing platform selected for our virtual sessions, and I will explain how to use it.  1)             You need to use a webcam or smartphone during the session.  2)             It is important that you be in a quiet, private space that is free of distractions (including cell phone or other devices) during the session.  3)             It is important to use a secure internet connection rather than public/free Wi-Fi.  4)             It is important to be on time. If you need to cancel or change your tele-appointment, you must notify the psychologist in advance by phone or email.  5)             We need a back-up plan (e.g., phone number where you can be reached) to restart the session or to reschedule it, in the event of technical problems.  6)             We need a safety plan that includes at least one emergency contact and the closest emergency room to your location, in the event of a crisis situation.  7)             If you are not an adult, we need the permission of your parent or legal guardian  "(and their contact information) for you to participate in telepsychology sessions.  Understanding and verbal agreement was attested to by the patient.    Magaly reported that she would like her notes to remain blocked at this time.     Reason for care: Anxiety; Depression  Method of therapy: Supportive  Therapy summary: Magaly reported that \"there is a lot going on.\" She stated that she has been having moments of light headedness and dizziness and is working with a cardiologist. Additionally, she is having GI issues and will have a colonoscopy next week.     She processed recent stressors related her cat's illness as well as interactions with co-workers and management at work.     She continued to discuss how she is managing changes with her father's health. She processed grief and loss. She is having her family over for [place] and discussed preparing for this.     She stated that she has read one chapter of her book but is looking forward to continuing this. She has a couple of medical procedures and appointments coming up and plans to use this down time for increased reading.     Goals: Reading for intuitive eating at least two chapters per week; continuing walking 2x per week 30 minutes/one mile     She denies SI/HI/AVH. We will follow-up in three weeks.   Identified goals/objectives:  Continue engaging in self-care activities; Utilize anxiety management strategies when there are increased stressors; identify workable schedule and routine (continue increasing activity); Continue exercising; reframe negative thinking with writing  Response to therapy: Engaged  Treatment plan and process: Continue with above stated tx goals; Psycho-education on anxiety management strategies; Goal setting for health and wellness; Schedule and Routine exploration; Education around decision-making  "

## 2024-03-26 PROBLEM — K44.9 HIATAL HERNIA: Status: ACTIVE | Noted: 2024-03-26

## 2024-03-28 ENCOUNTER — OFFICE VISIT (OUTPATIENT)
Dept: CARDIOLOGY | Facility: CLINIC | Age: 44
End: 2024-03-28
Payer: COMMERCIAL

## 2024-03-28 VITALS
BODY MASS INDEX: 36.29 KG/M2 | SYSTOLIC BLOOD PRESSURE: 120 MMHG | DIASTOLIC BLOOD PRESSURE: 88 MMHG | HEIGHT: 69 IN | HEART RATE: 68 BPM | WEIGHT: 245 LBS | OXYGEN SATURATION: 96 %

## 2024-03-28 DIAGNOSIS — R94.31 ABNORMAL EKG: ICD-10-CM

## 2024-03-28 DIAGNOSIS — I10 PRIMARY HYPERTENSION: Primary | ICD-10-CM

## 2024-03-28 DIAGNOSIS — R42 LIGHTHEADEDNESS: ICD-10-CM

## 2024-03-28 PROCEDURE — 3008F BODY MASS INDEX DOCD: CPT | Performed by: INTERNAL MEDICINE

## 2024-03-28 PROCEDURE — 1036F TOBACCO NON-USER: CPT | Performed by: INTERNAL MEDICINE

## 2024-03-28 PROCEDURE — 3074F SYST BP LT 130 MM HG: CPT | Performed by: INTERNAL MEDICINE

## 2024-03-28 PROCEDURE — 99214 OFFICE O/P EST MOD 30 MIN: CPT | Performed by: INTERNAL MEDICINE

## 2024-03-28 PROCEDURE — 3079F DIAST BP 80-89 MM HG: CPT | Performed by: INTERNAL MEDICINE

## 2024-03-28 ASSESSMENT — ENCOUNTER SYMPTOMS
LOSS OF SENSATION IN FEET: 0
OCCASIONAL FEELINGS OF UNSTEADINESS: 0
PSYCHIATRIC NEGATIVE: 1
MUSCULOSKELETAL NEGATIVE: 1
RESPIRATORY NEGATIVE: 1
ALLERGIC/IMMUNOLOGIC NEGATIVE: 1
CARDIOVASCULAR NEGATIVE: 1
EYES NEGATIVE: 1
DEPRESSION: 0
GASTROINTESTINAL NEGATIVE: 1
HEMATOLOGIC/LYMPHATIC NEGATIVE: 1
NEUROLOGICAL NEGATIVE: 1
ENDOCRINE NEGATIVE: 1
CONSTITUTIONAL NEGATIVE: 1

## 2024-03-28 ASSESSMENT — PAIN SCALES - GENERAL: PAINLEVEL: 0-NO PAIN

## 2024-03-28 NOTE — PROGRESS NOTES
Preventive Cardiology Clinic Note    Reason for Visit: Follow-up.  Referring Clinician: No ref. provider found     History of Present Illness: Magaly Louie is a 44 y.o. female with no prior cardiovascular history who was referred by her PCP for evaluation of an abnormal EKG and periodic lightheadedness and elevated blood pressure/hypertension and is here for follow-up visit.  Since her last visit she completed a 24-hour ABPM that showed mild stage I hypertension but no significant hypotension or bradycardia or tachycardia.  Her blood pressure is similar today at 120/88 mmHg.  She also completed an echocardiogram that showed normal cardiac structure and function and no significant valvular abnormality.  She she does still have occasional lightheadedness while working but it is somewhat improved.  She has not tried compression stockings consistently.    Past Medical History:  She has a past medical history of Acute upper respiratory infection, unspecified (02/13/2020), Burning with urination (06/26/2023), Candidiasis, unspecified, Cough (02/19/2024), COVID-19 (12/21/2021), Dysuria (06/26/2023), Exposure to severe acute respiratory syndrome coronavirus 2 (SARS-CoV-2) (02/19/2024), Headache (02/19/2024), Major depressive disorder, single episode, unspecified (11/01/2021), Other conditions influencing health status, Other specified abnormal findings of blood chemistry (10/03/2018), Other specified health status (12/28/2020), Other speech disturbances (02/27/2019), Other visual disturbances (03/01/2019), Personal history of other diseases of the respiratory system (07/01/2019), Personal history of other specified conditions (03/01/2019), Rash (02/19/2024), Segmental and somatic dysfunction of cervical region (03/01/2019), Segmental and somatic dysfunction of head region (03/01/2019), Strain of muscle, fascia and tendon of lower back, initial encounter (01/14/2020), Vaginal burning (06/26/2023), and Vaginitis  (10/26/2022).    Past Surgical History:  She has no past surgical history on file.    Social History:  She reports that she has quit smoking. Her smoking use included cigarettes. She has a 8.50 pack-year smoking history. She has been exposed to tobacco smoke. She has never used smokeless tobacco. She reports current alcohol use of about 2.0 standard drinks of alcohol per week. She reports that she does not use drugs.    Family History:  Family History   Problem Relation Name Age of Onset    COPD Mother Renetta     Anxiety disorder Mother Renetta     Sleep apnea Mother Renetta     Thyroid disease Mother Renetta     Diabetes Mother Renetta     Sleep apnea Father Mauricio     Diabetes Father Mauricio     Dementia Father Mauricio     Hypertension Sister Marielena        Allergies:  Penicillins, Bee pollen, Erythromycin, House dust, Oseltamivir, Oseltamivir phosphate, and Tree and shrub pollen    Outpatient Medications:  Current Outpatient Medications   Medication Instructions    escitalopram (LEXAPRO) 20 mg, oral, Daily    fluticasone (Flonase) 50 mcg/actuation nasal spray 2 sprays, nasal, Daily    levothyroxine (SYNTHROID, LEVOXYL) 75 mcg, oral, Daily    loratadine (CLARITIN) 10 mg, oral, Daily    metoprolol succinate XL (TOPROL-XL) 25 mg, oral, Daily    pantoprazole (PROTONIX) 20 mg, oral, Daily before breakfast, Do not crush, chew, or split.    prenatal no115/iron/folic acid (PRENATAL 19 ORAL) oral       Review of Systems:  Review of Systems   Constitutional: Negative.   HENT: Negative.     Eyes: Negative.    Cardiovascular: Negative.    Respiratory: Negative.     Endocrine: Negative.    Hematologic/Lymphatic: Negative.    Skin: Negative.    Musculoskeletal: Negative.    Gastrointestinal: Negative.    Genitourinary: Negative.    Neurological: Negative.    Psychiatric/Behavioral: Negative.     Allergic/Immunologic: Negative.        Last Recorded Vitals:  Vitals:    03/28/24 1153   BP: 120/88   BP Location: Left arm   Patient  "Position: Sitting   Pulse: 68   SpO2: 96%   Weight: 111 kg (245 lb)   Height: 1.753 m (5' 9\")       Physical Examination:  General: Well appearing, well-nourished, in no acute distress.  HEENT: Normocephalic atraumatic, pupils equal and reactive to light, extraocular muscles intact, no conjunctival injection, oropharynx clear without exudates.  Neck: Normal carotid arterial pulses, no arterial bruits, no thyromegaly.  Cardiac: Regular rhythm and normal heart rate.  S1, S2 present and normal.  No murmurs, rubs, or gallops.  PMI is nondisplaced. Jugular venous pulsations are normal.  Pulmonary: Normal breath sounds, no increased work of breathing, no wheezes or crackles.  GI: Normal bowel sounds, abdominal aorta not enlarged, no hepatosplenomegaly, no abdominal bruits.  Lower extremities: No cyanosis, clubbing, or edema.  No xanthelasma present. Normal distal pulses.  Skin: Skin intact. No significant rashes or lesions present.  Neuro: Alert and oriented x 3, normal attention and cognition, no focal motor or sensory neurologic deficits.  Psych: Normal affect and mood.  Musculoskeletal: Normal gait normal muscle tone.    Laboratory Studies:  Lab Results   Component Value Date    GLUCOSE 91 12/21/2023    CALCIUM 9.0 12/21/2023     12/21/2023    K 3.9 12/21/2023    CO2 28 12/21/2023     12/21/2023    BUN 12 12/21/2023    CREATININE 0.68 12/21/2023     Lab Results   Component Value Date    ALT 31 12/21/2023    AST 24 12/21/2023    ALKPHOS 73 12/21/2023    BILITOT 0.5 12/21/2023         Lab Results   Component Value Date    CHOL 242 (H) 12/21/2023    CHOL 205 (H) 06/23/2022     Lab Results   Component Value Date    HDL 57.3 12/21/2023    HDL 54.0 06/23/2022     Lab Results   Component Value Date    LDLCALC 148 (H) 12/21/2023     Lab Results   Component Value Date    TRIG 183 (H) 12/21/2023    TRIG 115 06/23/2022       Cardiology Tests:  Echo:  Transthoracic Echo Complete 03/07/2024  Left Ventricle: The left " ventricular systolic function is normal, with an estimated ejection fraction of 60-65%. There are no regional wall motion abnormalities. The left ventricular cavity size is normal. There is borderline concentric left ventricular hypertrophy. Spectral Doppler shows a normal pattern of left ventricular diastolic filling.  Left Atrium: The left atrium is normal in size. A bubble study using agitated saline was performed. Bubble study is negative.  Right Ventricle: The right ventricle is normal in size. There is normal right ventricular global systolic function.  Right Atrium: The right atrium is normal in size.  Aortic Valve: The aortic valve is trileaflet. There is no aortic valve thickening. There is no evidence of aortic valve regurgitation. The peak instantaneous gradient of the aortic valve is 8.3 mmHg. The mean gradient of the aortic valve is 4.1 mmHg.  Mitral Valve: The mitral valve is normal in structure. There is no evidence of mitral valve regurgitation.  Tricuspid Valve: The tricuspid valve is structurally normal. No evidence of tricuspid regurgitation. The right ventricular systolic pressure is unable to be estimated.  Pulmonic Valve: The pulmonic valve is structurally normal. There is trace pulmonic valve regurgitation.  Pericardium: There is no pericardial effusion noted.  Aorta: The aortic root is normal.  Pulmonary Artery: The main pulmonary artery is normal in size, and position, with normal bifurcation into the left and right pulmonary arteries.  Systemic Veins: The inferior vena cava appears to be of normal size.    24 hr ABPM:  Impression: Average 24-hour ambulatory blood pressure of 130/83 mmHg indicates stage 1 hypertension. Patient has abnormal nocturnal BP dipping. Mild white coat effect is seen at study start and end.  Recommendation: Given ASCVD risk <10% recommend non-pharmacological therapy for stage 1 hypertension.      Assessment and Plan:  Problem List Items Addressed This Visit           Cardiac and Vasculature    Hypertension - Primary     Other Visit Diagnoses       Lightheadedness        Abnormal EKG              Magaly Louie is a 44 y.o. female with no prior cardiovascular history who was referred by her PCP for evaluation of an abnormal EKG and periodic lightheadedness and elevated blood pressure/hypertension and is here for follow-up visit.  She does have stage I hypertension but given that her 10-year ASCVD risk is less than 10% I recommend nonpharmacologic methods to further improve her blood pressure which I counseled her about today including lifestyle modification, diet, exercise.  We also discussed avoidance maneuvers for her lightheadedness and using compression stockings as needed.  Her echocardiogram is normal and I do not think there is any indication for further cardiac testing at this time.  She will continue to follow-up with her primary care physician and I will be happy to see her back should any new cardiovascular issues arise.  Please do not hesitate to call or contact me with questions or concerns.    Joseph Randolph MD, JOANNA, FACC  Director,  [facility]  Director,  [facility]  Associate Professor of Medicine  [employer]

## 2024-04-04 ENCOUNTER — APPOINTMENT (OUTPATIENT)
Dept: BEHAVIORAL HEALTH | Facility: CLINIC | Age: 44
End: 2024-04-04
Payer: COMMERCIAL

## 2024-04-11 ENCOUNTER — TELEMEDICINE (OUTPATIENT)
Dept: BEHAVIORAL HEALTH | Facility: CLINIC | Age: 44
End: 2024-04-11
Payer: COMMERCIAL

## 2024-04-11 DIAGNOSIS — F41.1 GAD (GENERALIZED ANXIETY DISORDER): ICD-10-CM

## 2024-04-11 DIAGNOSIS — F33.0 MILD EPISODE OF RECURRENT MAJOR DEPRESSIVE DISORDER (CMS-HCC): ICD-10-CM

## 2024-04-11 PROCEDURE — 90834 PSYTX W PT 45 MINUTES: CPT | Performed by: PSYCHOLOGIST

## 2024-04-11 PROCEDURE — 3008F BODY MASS INDEX DOCD: CPT | Performed by: PSYCHOLOGIST

## 2024-04-11 NOTE — PROGRESS NOTES
Start time: 3:05pm  End time: 3:51pm    The patient was informed of the current need to conduct treatment via telephone or telehealth due to Covid-19 pandemic. Patient consented to the use of the platform that may not be HIPAA compliant. I have confirmed the patient's identity via the following (minimum of three) acceptable identifiers as per  Policy PH-9:   1. Last 4 of social:   2. :   3. Address:    Telephone/Televideo Informed Consent for Psychotherapy was reviewed with the patient as follows:  There are potential benefits and risks of the use of telephone or video-conferencing that differ from in-person sessions. Specifically, the telephone or televideo system we are using may not be HIPAA compliant and may present limits to patient confidentiality. Confidentiality still applies for telepsychology services, and nobody will record the session without your permission. You agree to use the telephone or video-conferencing platform selected for our virtual sessions, and I will explain how to use it.  1)             You need to use a webcam or smartphone during the session.  2)             It is important that you be in a quiet, private space that is free of distractions (including cell phone or other devices) during the session.  3)             It is important to use a secure internet connection rather than public/free Wi-Fi.  4)             It is important to be on time. If you need to cancel or change your tele-appointment, you must notify the psychologist in advance by phone or email.  5)             We need a back-up plan (e.g., phone number where you can be reached) to restart the session or to reschedule it, in the event of technical problems.  6)             We need a safety plan that includes at least one emergency contact and the closest emergency room to your location, in the event of a crisis situation.  7)             If you are not an adult, we need the permission of your parent or legal guardian  (and their contact information) for you to participate in telepsychology sessions.  Understanding and verbal agreement was attested to by the patient.    Magaly reported that she would like her notes to remain blocked at this time.     Reason for care: Anxiety; Depression  Method of therapy: Supportive; CBT  Therapy summary: Magaly reported that she she had Easter at her house and was feeling sad as Easter was the last time her father was at her home. She processed family dynamics and managing various emotions.     She continued to process physical health and identifying illnesses vs. anxiety. She has been reading her intuitive eating book and attempting to be more active. She has walked some and explored ways to motivate herself to be active.     She discussed challenging situation at her father's facility.     She processed relationships at work with clients and depersonalizing behavior. We explored self-compassion and ways to engage in self-kindness. She plans to write down one positive thing about herself each day before our next appointment.    Goals: Reading for intuitive eating at least two chapters per week; continuing walking 2x per week 30 minutes/one mile     She denies SI/HI/AVH. We will follow-up in three weeks.   Identified goals/objectives:  Continue engaging in self-care activities; Utilize anxiety management strategies when there are increased stressors; identify workable schedule and routine (continue increasing activity); Continue exercising; reframe negative thinking with writing  Response to therapy: Engaged  Treatment plan and process: Continue with above stated tx goals; Psycho-education on anxiety management strategies; Goal setting for health and wellness; Schedule and Routine exploration; Education around decision-making

## 2024-04-22 ENCOUNTER — TELEMEDICINE (OUTPATIENT)
Dept: BEHAVIORAL HEALTH | Facility: CLINIC | Age: 44
End: 2024-04-22
Payer: COMMERCIAL

## 2024-04-22 DIAGNOSIS — F33.0 MILD EPISODE OF RECURRENT MAJOR DEPRESSIVE DISORDER (CMS-HCC): ICD-10-CM

## 2024-04-22 DIAGNOSIS — F41.1 GAD (GENERALIZED ANXIETY DISORDER): ICD-10-CM

## 2024-04-22 PROCEDURE — 3008F BODY MASS INDEX DOCD: CPT | Performed by: PSYCHOLOGIST

## 2024-04-22 PROCEDURE — 90837 PSYTX W PT 60 MINUTES: CPT | Performed by: PSYCHOLOGIST

## 2024-04-22 NOTE — PROGRESS NOTES
Start time: 9:04am  End time: 9:58am    The patient was informed of the current need to conduct treatment via telephone or telehealth due to Covid-19 pandemic. Patient consented to the use of the platform that may not be HIPAA compliant. I have confirmed the patient's identity via the following (minimum of three) acceptable identifiers as per  Policy PH-9:   1. Last 4 of social:   2. :   3. Address:    Telephone/Televideo Informed Consent for Psychotherapy was reviewed with the patient as follows:  There are potential benefits and risks of the use of telephone or video-conferencing that differ from in-person sessions. Specifically, the telephone or televideo system we are using may not be HIPAA compliant and may present limits to patient confidentiality. Confidentiality still applies for telepsychology services, and nobody will record the session without your permission. You agree to use the telephone or video-conferencing platform selected for our virtual sessions, and I will explain how to use it.  1)             You need to use a webcam or smartphone during the session.  2)             It is important that you be in a quiet, private space that is free of distractions (including cell phone or other devices) during the session.  3)             It is important to use a secure internet connection rather than public/free Wi-Fi.  4)             It is important to be on time. If you need to cancel or change your tele-appointment, you must notify the psychologist in advance by phone or email.  5)             We need a back-up plan (e.g., phone number where you can be reached) to restart the session or to reschedule it, in the event of technical problems.  6)             We need a safety plan that includes at least one emergency contact and the closest emergency room to your location, in the event of a crisis situation.  7)             If you are not an adult, we need the permission of your parent or legal guardian  "(and their contact information) for you to participate in telepsychology sessions.  Understanding and verbal agreement was attested to by the patient.    Magaly reported that she would like her notes to remain blocked at this time.     Reason for care: Anxiety; Depression  Method of therapy: Supportive; CBT  Therapy summary: Magaly reported that she has been \"ok.\" She processed her father having surgery and recent interactions with her mother. She discussed her emotions surrounding taking care of her aging parents. We explored grief and loss.     She processed recent frustrations at work that have possibly led to resentments. She explored emotions around a specific dynamic and how it impacts her mood. 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 She began creating goals and a 5 year plan for both her personal and professional life. She discussed perceived connection between happiness in her personal life and her happiness at work.     Goals: Identifying one positive affirmation per day; Reading for intuitive eating at least two chapters per week; continuing walking 2x per week 30 minutes/one mile     She denies SI/HI/AVH. We will follow-up in three weeks.   Identified goals/objectives:  Continue engaging in self-care activities; Utilize anxiety management strategies when there are increased stressors; identify workable schedule and routine (continue increasing activity); Continue exercising; reframe negative thinking with writing  Response to therapy: Engaged  Treatment plan and process: Continue with above stated tx goals; Psycho-education on anxiety management strategies; Goal setting for health and wellness; Schedule and Routine exploration; Education around decision-making  "

## 2024-04-25 ENCOUNTER — APPOINTMENT (OUTPATIENT)
Dept: BEHAVIORAL HEALTH | Facility: CLINIC | Age: 44
End: 2024-04-25
Payer: COMMERCIAL

## 2024-05-06 ENCOUNTER — TELEMEDICINE (OUTPATIENT)
Dept: BEHAVIORAL HEALTH | Facility: CLINIC | Age: 44
End: 2024-05-06
Payer: COMMERCIAL

## 2024-05-06 DIAGNOSIS — F33.0 MILD EPISODE OF RECURRENT MAJOR DEPRESSIVE DISORDER (CMS-HCC): ICD-10-CM

## 2024-05-06 DIAGNOSIS — F41.1 GAD (GENERALIZED ANXIETY DISORDER): ICD-10-CM

## 2024-05-06 PROCEDURE — 90834 PSYTX W PT 45 MINUTES: CPT | Performed by: PSYCHOLOGIST

## 2024-05-06 PROCEDURE — 3008F BODY MASS INDEX DOCD: CPT | Performed by: PSYCHOLOGIST

## 2024-05-06 NOTE — Clinical Note
Magaly would like to schedule for June 10th at 10am, June 17th at 3pm and July 1st at 9am and July 22nd at 10am. Thank you!

## 2024-05-06 NOTE — PROGRESS NOTES
Start time: 10:05am  End time: 10:56am    The patient was informed of the current need to conduct treatment via telephone or telehealth due to Covid-19 pandemic. Patient consented to the use of the platform that may not be HIPAA compliant. I have confirmed the patient's identity via the following (minimum of three) acceptable identifiers as per  Policy PH-9:   1. Last 4 of social:   2. :   3. Address:    Telephone/Televideo Informed Consent for Psychotherapy was reviewed with the patient as follows:  There are potential benefits and risks of the use of telephone or video-conferencing that differ from in-person sessions. Specifically, the telephone or televideo system we are using may not be HIPAA compliant and may present limits to patient confidentiality. Confidentiality still applies for telepsychology services, and nobody will record the session without your permission. You agree to use the telephone or video-conferencing platform selected for our virtual sessions, and I will explain how to use it.  1)             You need to use a webcam or smartphone during the session.  2)             It is important that you be in a quiet, private space that is free of distractions (including cell phone or other devices) during the session.  3)             It is important to use a secure internet connection rather than public/free Wi-Fi.  4)             It is important to be on time. If you need to cancel or change your tele-appointment, you must notify the psychologist in advance by phone or email.  5)             We need a back-up plan (e.g., phone number where you can be reached) to restart the session or to reschedule it, in the event of technical problems.  6)             We need a safety plan that includes at least one emergency contact and the closest emergency room to your location, in the event of a crisis situation.  7)             If you are not an adult, we need the permission of your parent or legal guardian  "(and their contact information) for you to participate in telepsychology sessions.  Understanding and verbal agreement was attested to by the patient.    Magaly reported that she would like her notes to remain blocked at this time.     Reason for care: Anxiety; Depression  Method of therapy: Supportive; CBT  Therapy summary: Magaly stated that she is feeling \"exhausted.\" She processed stress with her father and family and how she is managing this stress. She described feeling anxiety related to visiting her father and shared challenges she has had with her mother regarding her father's care. We explored stress management techniques. She is attempting to walk, eat well, and stay out of bed.     She created relaxation plan: walking, listening to insight timer; practicing self-care; talking about her stress    We explored letting herself feel her emotions. She described feeling guilty and discussed ways to reduce guilt related to circumstances outside of her control.     Action plan: Identifying one positive affirmation per day; Reading for intuitive eating at least two chapters per week; continuing walking 2x per week 30 minutes/one mile (practicing relaxation plan)    She denies SI/HI/AVH. We will follow-up in two weeks.   Identified goals/objectives:  Continue engaging in self-care activities; Utilize anxiety management strategies when there are increased stressors; identify workable schedule and routine (continue increasing activity); Continue exercising; reframe negative thinking with writing  Response to therapy: Engaged  Treatment plan and process: Continue with above stated tx goals; Psycho-education on anxiety management strategies; Goal setting for health and wellness; Schedule and Routine exploration; Education around decision-making  " normal...

## 2024-05-09 ENCOUNTER — APPOINTMENT (OUTPATIENT)
Dept: BEHAVIORAL HEALTH | Facility: CLINIC | Age: 44
End: 2024-05-09
Payer: COMMERCIAL

## 2024-05-20 ENCOUNTER — TELEMEDICINE (OUTPATIENT)
Dept: BEHAVIORAL HEALTH | Facility: CLINIC | Age: 44
End: 2024-05-20
Payer: COMMERCIAL

## 2024-05-20 DIAGNOSIS — F33.0 MILD EPISODE OF RECURRENT MAJOR DEPRESSIVE DISORDER (CMS-HCC): ICD-10-CM

## 2024-05-20 DIAGNOSIS — F41.1 GAD (GENERALIZED ANXIETY DISORDER): ICD-10-CM

## 2024-05-20 PROCEDURE — 90834 PSYTX W PT 45 MINUTES: CPT | Performed by: PSYCHOLOGIST

## 2024-05-20 PROCEDURE — 3008F BODY MASS INDEX DOCD: CPT | Performed by: PSYCHOLOGIST

## 2024-05-20 NOTE — PROGRESS NOTES
Start time: 10:06am  End time: 10:54am    The patient was informed of the current need to conduct treatment via telephone or telehealth due to Covid-19 pandemic. Patient consented to the use of the platform that may not be HIPAA compliant. I have confirmed the patient's identity via the following (minimum of three) acceptable identifiers as per  Policy PH-9:   1. Last 4 of social:   2. :   3. Address:    Telephone/Televideo Informed Consent for Psychotherapy was reviewed with the patient as follows:  There are potential benefits and risks of the use of telephone or video-conferencing that differ from in-person sessions. Specifically, the telephone or televideo system we are using may not be HIPAA compliant and may present limits to patient confidentiality. Confidentiality still applies for telepsychology services, and nobody will record the session without your permission. You agree to use the telephone or video-conferencing platform selected for our virtual sessions, and I will explain how to use it.  1)             You need to use a webcam or smartphone during the session.  2)             It is important that you be in a quiet, private space that is free of distractions (including cell phone or other devices) during the session.  3)             It is important to use a secure internet connection rather than public/free Wi-Fi.  4)             It is important to be on time. If you need to cancel or change your tele-appointment, you must notify the psychologist in advance by phone or email.  5)             We need a back-up plan (e.g., phone number where you can be reached) to restart the session or to reschedule it, in the event of technical problems.  6)             We need a safety plan that includes at least one emergency contact and the closest emergency room to your location, in the event of a crisis situation.  7)             If you are not an adult, we need the permission of your parent or legal guardian  "(and their contact information) for you to participate in telepsychology sessions.  Understanding and verbal agreement was attested to by the patient.    Magaly reported that she would like her notes to remain blocked at this time.     Reason for care: Anxiety; Depression  Method of therapy: Supportive; CBT  Therapy summary: Magaly processed feeling \"super emotional\" recently. She has been feeling more \"down\" and \"sad.\" She has been trying to walk more and practicing meditations. She stated that her emotions built over the week and then she felt she finally had a release with her sister and cried. She described feeling better since then.     She explored trying to focus her attention to what she can control. She is focusing on seeing her father, taking care of herself, and focusing on simplicity. She discussed how to better manage her mood changes and to validate her feelings of being overwhelmed by responsibilities at this time.     Action plan: Identifying one positive affirmation per day; continuing walking 2x per week 30 minutes/one mile (practicing relaxation plan)    She denies SI/HI/AVH. We will follow-up in two weeks.   Identified goals/objectives:  Continue engaging in self-care activities; Utilize anxiety management strategies when there are increased stressors; identify workable schedule and routine (continue increasing activity); Continue exercising; reframe negative thinking with writing  Response to therapy: Engaged  Treatment plan and process: Continue with above stated tx goals; Psycho-education on anxiety management strategies; Goal setting for health and wellness; Schedule and Routine exploration; Education around decision-making  "

## 2024-05-23 ENCOUNTER — APPOINTMENT (OUTPATIENT)
Dept: BEHAVIORAL HEALTH | Facility: CLINIC | Age: 44
End: 2024-05-23
Payer: COMMERCIAL

## 2024-06-10 ENCOUNTER — TELEMEDICINE (OUTPATIENT)
Dept: BEHAVIORAL HEALTH | Facility: CLINIC | Age: 44
End: 2024-06-10
Payer: COMMERCIAL

## 2024-06-10 DIAGNOSIS — F33.0 MILD EPISODE OF RECURRENT MAJOR DEPRESSIVE DISORDER (CMS-HCC): ICD-10-CM

## 2024-06-10 DIAGNOSIS — F41.1 GAD (GENERALIZED ANXIETY DISORDER): ICD-10-CM

## 2024-06-10 PROCEDURE — 90834 PSYTX W PT 45 MINUTES: CPT | Performed by: PSYCHOLOGIST

## 2024-06-10 PROCEDURE — 3008F BODY MASS INDEX DOCD: CPT | Performed by: PSYCHOLOGIST

## 2024-06-10 NOTE — PROGRESS NOTES
Start time: 9:03am  End time: 9:49am    The patient was informed of the current need to conduct treatment via telephone or telehealth due to Covid-19 pandemic. Patient consented to the use of the platform that may not be HIPAA compliant. I have confirmed the patient's identity via the following (minimum of three) acceptable identifiers as per  Policy PH-9:   1. Last 4 of social:   2. :   3. Address:    Telephone/Televideo Informed Consent for Psychotherapy was reviewed with the patient as follows:  There are potential benefits and risks of the use of telephone or video-conferencing that differ from in-person sessions. Specifically, the telephone or televideo system we are using may not be HIPAA compliant and may present limits to patient confidentiality. Confidentiality still applies for telepsychology services, and nobody will record the session without your permission. You agree to use the telephone or video-conferencing platform selected for our virtual sessions, and I will explain how to use it.  1)             You need to use a webcam or smartphone during the session.  2)             It is important that you be in a quiet, private space that is free of distractions (including cell phone or other devices) during the session.  3)             It is important to use a secure internet connection rather than public/free Wi-Fi.  4)             It is important to be on time. If you need to cancel or change your tele-appointment, you must notify the psychologist in advance by phone or email.  5)             We need a back-up plan (e.g., phone number where you can be reached) to restart the session or to reschedule it, in the event of technical problems.  6)             We need a safety plan that includes at least one emergency contact and the closest emergency room to your location, in the event of a crisis situation.  7)             If you are not an adult, we need the permission of your parent or legal guardian  (and their contact information) for you to participate in telepsychology sessions.  Understanding and verbal agreement was attested to by the patient.    Magaly reported that she would like her notes to remain blocked at this time.     Reason for care: Anxiety; Depression  Method of therapy: Supportive; CBT  Therapy summary: Magaly continued to process how her father's health is impacting her mental health. She discussed feeling responsible for others and how she is working on reducing the guilt that drives this behavior. She explored relationships in which she has taken on too much responsibility.     She processed recent intense emotions and attempting to regulate. She has noticed increase in depressive symptoms likely related to grief and loss. Additionally, she finds that she has been over-thinking and experiencing racing thoughts before bed and at various times throughout the day. We reviewed coping for racing thoughts.     She reported that she has occasionally practiced coping for racing thoughts (e.g., grounding, playing games on her phone, practiced self-care, leaves on a stream--in the past). I provided education on the three good things exercise and the 26646 technique.     Action plan: Practicing three good things; watch [employer] video; Continued: Identifying one positive affirmation per day; continuing walking 2x per week 30 minutes/one mile (practicing relaxation plan)    She denies SI/HI/AVH. We will follow-up in two weeks.   Identified goals/objectives:  Continue engaging in self-care activities; Utilize anxiety management strategies when there are increased stressors; identify workable schedule and routine (continue increasing activity); Continue exercising; reframe negative thinking with writing  Response to therapy: Engaged  Treatment plan and process: Continue with above stated tx goals; Psycho-education on anxiety management strategies; Goal setting for health and wellness; Schedule and  Routine exploration; Education around decision-making

## 2024-06-17 ENCOUNTER — APPOINTMENT (OUTPATIENT)
Dept: BEHAVIORAL HEALTH | Facility: CLINIC | Age: 44
End: 2024-06-17
Payer: COMMERCIAL

## 2024-06-17 DIAGNOSIS — F33.0 MILD EPISODE OF RECURRENT MAJOR DEPRESSIVE DISORDER (CMS-HCC): ICD-10-CM

## 2024-06-17 DIAGNOSIS — F41.1 GAD (GENERALIZED ANXIETY DISORDER): ICD-10-CM

## 2024-06-17 PROCEDURE — 90837 PSYTX W PT 60 MINUTES: CPT | Performed by: PSYCHOLOGIST

## 2024-06-17 PROCEDURE — 3008F BODY MASS INDEX DOCD: CPT | Performed by: PSYCHOLOGIST

## 2024-06-17 NOTE — PROGRESS NOTES
Start time: 3:06pm  End time: 4:00pm    The patient was informed of the current need to conduct treatment via telephone or telehealth due to Covid-19 pandemic. Patient consented to the use of the platform that may not be HIPAA compliant. I have confirmed the patient's identity via the following (minimum of three) acceptable identifiers as per  Policy PH-9:   1. Last 4 of social:   2. :   3. Address:    Telephone/Televideo Informed Consent for Psychotherapy was reviewed with the patient as follows:  There are potential benefits and risks of the use of telephone or video-conferencing that differ from in-person sessions. Specifically, the telephone or televideo system we are using may not be HIPAA compliant and may present limits to patient confidentiality. Confidentiality still applies for telepsychology services, and nobody will record the session without your permission. You agree to use the telephone or video-conferencing platform selected for our virtual sessions, and I will explain how to use it.  1)             You need to use a webcam or smartphone during the session.  2)             It is important that you be in a quiet, private space that is free of distractions (including cell phone or other devices) during the session.  3)             It is important to use a secure internet connection rather than public/free Wi-Fi.  4)             It is important to be on time. If you need to cancel or change your tele-appointment, you must notify the psychologist in advance by phone or email.  5)             We need a back-up plan (e.g., phone number where you can be reached) to restart the session or to reschedule it, in the event of technical problems.  6)             We need a safety plan that includes at least one emergency contact and the closest emergency room to your location, in the event of a crisis situation.  7)             If you are not an adult, we need the permission of your parent or legal guardian  "(and their contact information) for you to participate in telepsychology sessions.  Understanding and verbal agreement was attested to by the patient.    Magaly reported that she would like her notes to remain blocked at this time.     Reason for care: Anxiety; Depression  Method of therapy: Supportive; CBT  Therapy summary: Magaly reported that she is feeling \"crabby\" today. She started a new medication for her digestive issues and is having side effects that are impacting her frustration tolerance.     She is currently packing for the [employer] and struggling with this as well. She discussed her clothes not fitting and how this affects her view of self. She shared recent steps toward weight loss. She has been attempting to use the 56486 technique but has not been walking as much as she would like. She went to [employer] this weekend and walked a lot then. She discussed a barrier being how busy she is and how much she has been working. We explored activities that she enjoys.     She discussed values. She identified values as family, fairness, ethics, creativity, friendship, quality, morality, growth, humor, improvement, personal expression, openness, spirituality. She explored how to put these values into action and explored the positive impact this can have on mood.     Action plan: Practicing three good things; continue to use 5 second rule     Continued: Identifying one positive affirmation per day to help with self-compassion and improving self-esteem; continuing walking 2x per week 30 minutes/one mile (practicing relaxation plan)    She denies SI/HI/AVH. We will follow-up in two weeks.   Identified goals/objectives: Continue engaging in self-care activities; Utilize anxiety management strategies when there are increased stressors; identify workable schedule and routine (continue increasing activity); Continue exercising; reframe negative thinking with writing  Response to therapy: Engaged  Treatment " plan and process: Continue with above stated tx goals; Psycho-education on anxiety management strategies; Goal setting for health and wellness; Schedule and Routine exploration; Education around decision-making

## 2024-07-01 ENCOUNTER — APPOINTMENT (OUTPATIENT)
Dept: BEHAVIORAL HEALTH | Facility: CLINIC | Age: 44
End: 2024-07-01
Payer: COMMERCIAL

## 2024-07-01 DIAGNOSIS — F41.1 GAD (GENERALIZED ANXIETY DISORDER): ICD-10-CM

## 2024-07-01 DIAGNOSIS — F33.0 MILD EPISODE OF RECURRENT MAJOR DEPRESSIVE DISORDER (CMS-HCC): ICD-10-CM

## 2024-07-01 PROCEDURE — 90834 PSYTX W PT 45 MINUTES: CPT | Performed by: PSYCHOLOGIST

## 2024-07-01 PROCEDURE — 3008F BODY MASS INDEX DOCD: CPT | Performed by: PSYCHOLOGIST

## 2024-07-01 NOTE — PROGRESS NOTES
Start time: 9:05am  End time: 9:54am    The patient was informed of the current need to conduct treatment via telephone or telehealth due to Covid-19 pandemic. Patient consented to the use of the platform that may not be HIPAA compliant. I have confirmed the patient's identity via the following (minimum of three) acceptable identifiers as per  Policy PH-9:   1. Last 4 of social:   2. :   3. Address:    Telephone/Televideo Informed Consent for Psychotherapy was reviewed with the patient as follows:  There are potential benefits and risks of the use of telephone or video-conferencing that differ from in-person sessions. Specifically, the telephone or televideo system we are using may not be HIPAA compliant and may present limits to patient confidentiality. Confidentiality still applies for telepsychology services, and nobody will record the session without your permission. You agree to use the telephone or video-conferencing platform selected for our virtual sessions, and I will explain how to use it.  1)             You need to use a webcam or smartphone during the session.  2)             It is important that you be in a quiet, private space that is free of distractions (including cell phone or other devices) during the session.  3)             It is important to use a secure internet connection rather than public/free Wi-Fi.  4)             It is important to be on time. If you need to cancel or change your tele-appointment, you must notify the psychologist in advance by phone or email.  5)             We need a back-up plan (e.g., phone number where you can be reached) to restart the session or to reschedule it, in the event of technical problems.  6)             We need a safety plan that includes at least one emergency contact and the closest emergency room to your location, in the event of a crisis situation.  7)             If you are not an adult, we need the permission of your parent or legal guardian  "(and their contact information) for you to participate in telepsychology sessions.  Understanding and verbal agreement was attested to by the patient.    Magaly reported that she would like her notes to remain blocked at this time.     Reason for care: Anxiety; Depression  Method of therapy: Supportive; CBT  Therapy summary: Magaly stated that she returned home from [place] last night. She stated her father entered hospice last week and she processed her grief around his health and the loss she is feeling.     She discussed guilt and feeling as though she could have done more. We explored various emotions and experiences connected to grief such as bargaining, denial and guilt.     She described feeling \"sad and scared.\" She processed fear of the unknown and her father not being around. She discussed receiving support from her sisters and mother and continued difficulties regarding interactions with others.     Action plan: Processing grief related to father's illness and entering hospice    She denies SI/HI/AVH. We will follow-up in two weeks and check-in as needed.   Identified goals/objectives: Continue engaging in self-care activities; Utilize anxiety management strategies when there are increased stressors; identify workable schedule and routine (continue increasing activity); Continue exercising; reframe negative thinking with writing  Response to therapy: Engaged  Treatment plan and process: Continue with above stated tx goals; Psycho-education on anxiety management strategies; Goal setting for health and wellness; Schedule and Routine exploration; Education around decision-making; Processing grief and education around grief and loss   "

## 2024-07-22 ENCOUNTER — APPOINTMENT (OUTPATIENT)
Dept: BEHAVIORAL HEALTH | Facility: CLINIC | Age: 44
End: 2024-07-22
Payer: COMMERCIAL

## 2024-07-22 DIAGNOSIS — F41.1 GAD (GENERALIZED ANXIETY DISORDER): ICD-10-CM

## 2024-07-22 DIAGNOSIS — F33.0 MILD EPISODE OF RECURRENT MAJOR DEPRESSIVE DISORDER (CMS-HCC): ICD-10-CM

## 2024-07-22 PROCEDURE — 90837 PSYTX W PT 60 MINUTES: CPT | Performed by: PSYCHOLOGIST

## 2024-07-22 NOTE — PROGRESS NOTES
Start time: 10:05am  End time: 10:59am    The patient was informed of the current need to conduct treatment via telephone or telehealth due to Covid-19 pandemic. Patient consented to the use of the platform that may not be HIPAA compliant. I have confirmed the patient's identity via the following (minimum of three) acceptable identifiers as per  Policy PH-9:   1. Last 4 of social:   2. :   3. Address:    Telephone/Televideo Informed Consent for Psychotherapy was reviewed with the patient as follows:  There are potential benefits and risks of the use of telephone or video-conferencing that differ from in-person sessions. Specifically, the telephone or televideo system we are using may not be HIPAA compliant and may present limits to patient confidentiality. Confidentiality still applies for telepsychology services, and nobody will record the session without your permission. You agree to use the telephone or video-conferencing platform selected for our virtual sessions, and I will explain how to use it.  1)             You need to use a webcam or smartphone during the session.  2)             It is important that you be in a quiet, private space that is free of distractions (including cell phone or other devices) during the session.  3)             It is important to use a secure internet connection rather than public/free Wi-Fi.  4)             It is important to be on time. If you need to cancel or change your tele-appointment, you must notify the psychologist in advance by phone or email.  5)             We need a back-up plan (e.g., phone number where you can be reached) to restart the session or to reschedule it, in the event of technical problems.  6)             We need a safety plan that includes at least one emergency contact and the closest emergency room to your location, in the event of a crisis situation.  7)             If you are not an adult, we need the permission of your parent or legal guardian  "(and their contact information) for you to participate in telepsychology sessions.  Understanding and verbal agreement was attested to by the patient.    Magaly reported that she would like her notes to remain blocked at this time.     Reason for care: Anxiety; Depression  Method of therapy: Supportive; CBT  Therapy summary: Magaly processed the passing of her father. She processed spending his last days with him and the emotions that she has been experiencing. Additionally, she discussed managing family dynamics and their reactions at this time.     She discussed her own grief process and the ups and downs she has felt over the last few weeks. She stated that they stayed with her father the last week he was in hospice.     She reported that she is attempting to balance self-care, rest and activity. She stated that she was \"surprised\" by her ability to manage taking care of her father and all of the emotions around this loss.     Action plan: Processing grief related to father's passing; engaging in self-care and balancing responsibilities     She denies SI/HI/AVH. We will follow-up in two weeks and check-in as needed.   Identified goals/objectives: Continue engaging in self-care activities; Utilize anxiety management strategies when there are increased stressors; identify workable schedule and routine (continue increasing activity); Continue exercising; reframe negative thinking with writing  Response to therapy: Engaged  Treatment plan and process: Continue with above stated tx goals; Psycho-education on anxiety management strategies; Goal setting for health and wellness; Schedule and Routine exploration; Education around decision-making; Processing grief; Providing education around grief and loss   "

## 2024-07-29 ENCOUNTER — TELEPHONE (OUTPATIENT)
Dept: PRIMARY CARE | Facility: CLINIC | Age: 44
End: 2024-07-29
Payer: COMMERCIAL

## 2024-07-29 DIAGNOSIS — R12 HEARTBURN: ICD-10-CM

## 2024-07-29 NOTE — TELEPHONE ENCOUNTER
REFILL  MEDICATION:     Pantoprazole 20 MG EC; Take 1 tablet once daily in the morning. Take before meals. Do not crush, chew, or split.    [facility]: [facility]   PHARM NUMBER: [phone]    LR: 7/25/23       30 tablets with 11 refills   LV: 2/20/24  NV: No future appt.

## 2024-07-30 RX ORDER — PANTOPRAZOLE SODIUM 20 MG/1
20 TABLET, DELAYED RELEASE ORAL
Qty: 30 TABLET | Refills: 11 | Status: SHIPPED | OUTPATIENT
Start: 2024-07-30 | End: 2025-07-30

## 2024-08-12 ENCOUNTER — APPOINTMENT (OUTPATIENT)
Dept: BEHAVIORAL HEALTH | Facility: CLINIC | Age: 44
End: 2024-08-12
Payer: COMMERCIAL

## 2024-08-12 DIAGNOSIS — F33.0 MILD EPISODE OF RECURRENT MAJOR DEPRESSIVE DISORDER (CMS-HCC): ICD-10-CM

## 2024-08-12 DIAGNOSIS — F41.1 GAD (GENERALIZED ANXIETY DISORDER): ICD-10-CM

## 2024-08-12 PROCEDURE — 90837 PSYTX W PT 60 MINUTES: CPT | Performed by: PSYCHOLOGIST

## 2024-08-12 NOTE — PROGRESS NOTES
Start time: 10:03am  End time: 10:58am    The patient was informed of the current need to conduct treatment via telephone or telehealth due to Covid-19 pandemic. Patient consented to the use of the platform that may not be HIPAA compliant. I have confirmed the patient's identity via the following (minimum of three) acceptable identifiers as per  Policy PH-9:   1. Last 4 of social:   2. :   3. Address:    Telephone/Televideo Informed Consent for Psychotherapy was reviewed with the patient as follows:  There are potential benefits and risks of the use of telephone or video-conferencing that differ from in-person sessions. Specifically, the telephone or televideo system we are using may not be HIPAA compliant and may present limits to patient confidentiality. Confidentiality still applies for telepsychology services, and nobody will record the session without your permission. You agree to use the telephone or video-conferencing platform selected for our virtual sessions, and I will explain how to use it.  1)             You need to use a webcam or smartphone during the session.  2)             It is important that you be in a quiet, private space that is free of distractions (including cell phone or other devices) during the session.  3)             It is important to use a secure internet connection rather than public/free Wi-Fi.  4)             It is important to be on time. If you need to cancel or change your tele-appointment, you must notify the psychologist in advance by phone or email.  5)             We need a back-up plan (e.g., phone number where you can be reached) to restart the session or to reschedule it, in the event of technical problems.  6)             We need a safety plan that includes at least one emergency contact and the closest emergency room to your location, in the event of a crisis situation.  7)             If you are not an adult, we need the permission of your parent or legal guardian  "(and their contact information) for you to participate in telepsychology sessions.  Understanding and verbal agreement was attested to by the patient.    Magaly reported that she would like her notes to remain blocked at this time.     Reason for care: Anxiety; Depression  Method of therapy: Supportive; CBT  Therapy summary: Magaly continued to process the passing of her father. She discussed various interactions she has had with other people and the challenges she has had as well as support she has experienced. We explored ways to approach difficult interactions and how to set boundaries. She discussed not feeling ready to go through her father's belongings and explored how each member of her family is grieving differently.     She reported that she has been more critical and harsh on herself. She discussed taking steps toward health and weight loss. She is walking about 4 days per week and bought a book about self-care. The books is called \"This was meant to find you when you needed it the most.\" We discussed progress she is making toward her goals.     We will continue to process grief and loss.     Action plan: Processing grief related to father's passing; engaging in self-care and balancing responsibilities     She denies SI/HI/AVH. We will follow-up in two weeks and check-in as needed.   Identified goals/objectives: Processing grief; Continue engaging in self-care activities; identify workable schedule and routine (continue increasing activity); Continue exercising; reframe negative thinking with writing  Response to therapy: Engaged  Treatment plan and process: Continue with above stated tx goals; Psycho-education on anxiety management strategies; Goal setting for health and wellness; Schedule and Routine exploration; Education around decision-making; Processing grief; Providing education around grief and loss   "

## 2024-08-26 ENCOUNTER — APPOINTMENT (OUTPATIENT)
Dept: BEHAVIORAL HEALTH | Facility: CLINIC | Age: 44
End: 2024-08-26
Payer: COMMERCIAL

## 2024-08-26 DIAGNOSIS — F33.0 MILD EPISODE OF RECURRENT MAJOR DEPRESSIVE DISORDER (CMS-HCC): ICD-10-CM

## 2024-08-26 DIAGNOSIS — F41.1 GAD (GENERALIZED ANXIETY DISORDER): ICD-10-CM

## 2024-08-26 PROCEDURE — 90837 PSYTX W PT 60 MINUTES: CPT | Performed by: PSYCHOLOGIST

## 2024-08-26 NOTE — Clinical Note
Magaly needs to cancel her appointment on September 9th. Can she please reschedule for October 14th at 10am and October 28th at 10am.

## 2024-09-09 ENCOUNTER — APPOINTMENT (OUTPATIENT)
Dept: BEHAVIORAL HEALTH | Facility: CLINIC | Age: 44
End: 2024-09-09
Payer: COMMERCIAL

## 2024-09-17 DIAGNOSIS — I10 PRIMARY HYPERTENSION: ICD-10-CM

## 2024-09-17 RX ORDER — METOPROLOL SUCCINATE 25 MG/1
25 TABLET, EXTENDED RELEASE ORAL DAILY
Qty: 90 TABLET | Refills: 3 | OUTPATIENT
Start: 2024-09-17

## 2024-09-17 RX ORDER — METOPROLOL SUCCINATE 25 MG/1
25 TABLET, EXTENDED RELEASE ORAL DAILY
Qty: 90 TABLET | Refills: 0 | Status: SHIPPED | OUTPATIENT
Start: 2024-09-17

## 2024-09-17 NOTE — TELEPHONE ENCOUNTER
REFILL  MEDICATION:     Metoprolol Succinate XL 25 MG; One tablet once daily.     [facility]: [facility]   PHARM NUMBER: [phone]    LR: 7/25/23      90 tablets with 3 refills   LV: 2/20/24  NV: No future appt.

## 2024-09-30 ENCOUNTER — APPOINTMENT (OUTPATIENT)
Dept: BEHAVIORAL HEALTH | Facility: CLINIC | Age: 44
End: 2024-09-30
Payer: COMMERCIAL

## 2024-09-30 DIAGNOSIS — F41.1 GAD (GENERALIZED ANXIETY DISORDER): ICD-10-CM

## 2024-09-30 DIAGNOSIS — F33.0 MILD EPISODE OF RECURRENT MAJOR DEPRESSIVE DISORDER (CMS-HCC): ICD-10-CM

## 2024-09-30 PROCEDURE — 90837 PSYTX W PT 60 MINUTES: CPT | Performed by: PSYCHOLOGIST

## 2024-09-30 NOTE — PROGRESS NOTES
Start time: 10:05am  End time: 10:59am    The patient was informed of the current need to conduct treatment via telephone or telehealth due to Covid-19 pandemic. Patient consented to the use of the platform that may not be HIPAA compliant. I have confirmed the patient's identity via the following (minimum of three) acceptable identifiers as per  Policy PH-9:   1. Last 4 of social:   2. :   3. Address:    Telephone/Televideo Informed Consent for Psychotherapy was reviewed with the patient as follows:  There are potential benefits and risks of the use of telephone or video-conferencing that differ from in-person sessions. Specifically, the telephone or televideo system we are using may not be HIPAA compliant and may present limits to patient confidentiality. Confidentiality still applies for telepsychology services, and nobody will record the session without your permission. You agree to use the telephone or video-conferencing platform selected for our virtual sessions, and I will explain how to use it.  1)             You need to use a webcam or smartphone during the session.  2)             It is important that you be in a quiet, private space that is free of distractions (including cell phone or other devices) during the session.  3)             It is important to use a secure internet connection rather than public/free Wi-Fi.  4)             It is important to be on time. If you need to cancel or change your tele-appointment, you must notify the psychologist in advance by phone or email.  5)             We need a back-up plan (e.g., phone number where you can be reached) to restart the session or to reschedule it, in the event of technical problems.  6)             We need a safety plan that includes at least one emergency contact and the closest emergency room to your location, in the event of a crisis situation.  7)             If you are not an adult, we need the permission of your parent or legal guardian  "(and their contact information) for you to participate in telepsychology sessions.  Understanding and verbal agreement was attested to by the patient.    Magaly reported that she would like her notes to remain blocked at this time.     Reason for care: Anxiety; Depression  Method of therapy: Supportive; CBT  Therapy summary: Magaly stated that she had a recent increase in her anxiety. She described feeling dizzy and blurred vision and realized this likely due to her recent stress levels. She continued to process grief and dynamics with her family.     She and her  were traveling recently and this reminded her of her father. She processed travel anxiety and using relaxation techniques to help her calm. She stated that she was practicing grounding and deep breathing as well as using prayer. She shared about this trip and dynamics while they were on vacation.     She stated that she is \"having foot problems again.\" She processed needing to see the podiatrist again and discussed what has helped her in the past.     She discussed recent difficulties with decision making. We explored how anxiety influences her decision-making ability. Additionally, we reviewed relaxation exercises for increase in her anxiety. She noted that she has been watching television and scrolling on her phone but unsure if this is considered relaxation.     She discussed recent interaction at work and how she coped. 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     Action plan: Continue processing grief related to father's passing; practice relaxation daily (I.e., deep breathing, PMR)    She denies SI/HI/AVH. We will follow-up in two weeks.  Identified goals/objectives: Processing grief; engaging in more structured relaxation; Continue engaging in self-care activities; identify workable schedule and routine (continue increasing activity); Continue exercising; reframe negative thinking with writing  Response to therapy: " Engaged  Treatment plan and process: Continue with above stated tx goals; Psycho-education on anxiety management strategies; Goal setting for health and wellness; Schedule and Routine exploration; Education around decision-making; Processing grief; Providing education around grief and loss

## 2024-10-14 ENCOUNTER — APPOINTMENT (OUTPATIENT)
Dept: BEHAVIORAL HEALTH | Facility: CLINIC | Age: 44
End: 2024-10-14
Payer: COMMERCIAL

## 2024-10-14 DIAGNOSIS — F41.1 GAD (GENERALIZED ANXIETY DISORDER): ICD-10-CM

## 2024-10-14 DIAGNOSIS — F33.0 MILD EPISODE OF RECURRENT MAJOR DEPRESSIVE DISORDER (CMS-HCC): ICD-10-CM

## 2024-10-14 PROCEDURE — 90834 PSYTX W PT 45 MINUTES: CPT | Performed by: PSYCHOLOGIST

## 2024-10-14 NOTE — PROGRESS NOTES
Start time: 10:05am  End time: 10:49am    The patient was informed of the current need to conduct treatment via telephone or telehealth due to Covid-19 pandemic. Patient consented to the use of the platform that may not be HIPAA compliant. I have confirmed the patient's identity via the following (minimum of three) acceptable identifiers as per  Policy PH-9:   1. Last 4 of social:   2. :   3. Address:    Telephone/Televideo Informed Consent for Psychotherapy was reviewed with the patient as follows:  There are potential benefits and risks of the use of telephone or video-conferencing that differ from in-person sessions. Specifically, the telephone or televideo system we are using may not be HIPAA compliant and may present limits to patient confidentiality. Confidentiality still applies for telepsychology services, and nobody will record the session without your permission. You agree to use the telephone or video-conferencing platform selected for our virtual sessions, and I will explain how to use it.  1)             You need to use a webcam or smartphone during the session.  2)             It is important that you be in a quiet, private space that is free of distractions (including cell phone or other devices) during the session.  3)             It is important to use a secure internet connection rather than public/free Wi-Fi.  4)             It is important to be on time. If you need to cancel or change your tele-appointment, you must notify the psychologist in advance by phone or email.  5)             We need a back-up plan (e.g., phone number where you can be reached) to restart the session or to reschedule it, in the event of technical problems.  6)             We need a safety plan that includes at least one emergency contact and the closest emergency room to your location, in the event of a crisis situation.  7)             If you are not an adult, we need the permission of your parent or legal guardian  "(and their contact information) for you to participate in telepsychology sessions.  Understanding and verbal agreement was attested to by the patient.    Magaly reported that she would like her notes to remain blocked at this time.     Reason for care: Anxiety; Depression  Method of therapy: Supportive; CBT  Therapy summary: Magaly reported that she has \"tied up loose ends\" over the last two weeks. She discussed completing recent tasks. However, she has been finding it difficult to do certain things to take care of herself. She has been attempting to eat healthy, organize her space and going to counseling. She would like to improve how she takes care of herself physically.     She reported that she is feeling \"very tired.\" She is attempting to distinguish whether fatigue is depression related or more related to physical health. She finds that she is more motivated on days she is not working. We discussed ways to help increase her energy and motivation through creating new, healthy habits. She explored what kept her motivated in the past (e.g., feeling busier led to more action). We reviewed skill of behavioral activation.     She processed her mother's health and feeling guilty for not doing things with her as well as discussed relation to grief.     Action plan: Continue processing grief related to father's passing; practice relaxation daily (I.e., deep breathing, PMR); focus on motivation through creating schedule and routine    She denies SI/HI/AVH. We will follow-up in two weeks.  Identified goals/objectives: Processing grief; engaging in more structured relaxation; Continue engaging in self-care activities; identify workable schedule and routine (continue increasing activity); Continue exercising; reframe negative thinking with writing  Response to therapy: Engaged  Treatment plan and process: Continue with above stated tx goals; Psycho-education on anxiety management strategies; Goal setting for health and " wellness; Schedule and Routine exploration; Education around decision-making; Processing grief; Providing education around grief and loss

## 2024-10-15 ENCOUNTER — APPOINTMENT (OUTPATIENT)
Dept: PRIMARY CARE | Facility: CLINIC | Age: 44
End: 2024-10-15
Payer: COMMERCIAL

## 2024-10-15 VITALS
WEIGHT: 246 LBS | BODY MASS INDEX: 36.33 KG/M2 | SYSTOLIC BLOOD PRESSURE: 126 MMHG | TEMPERATURE: 96.4 F | DIASTOLIC BLOOD PRESSURE: 72 MMHG

## 2024-10-15 DIAGNOSIS — I10 PRIMARY HYPERTENSION: ICD-10-CM

## 2024-10-15 DIAGNOSIS — E66.01 MORBID OBESITY (MULTI): Primary | ICD-10-CM

## 2024-10-15 DIAGNOSIS — F41.1 GAD (GENERALIZED ANXIETY DISORDER): ICD-10-CM

## 2024-10-15 DIAGNOSIS — R12 HEARTBURN: ICD-10-CM

## 2024-10-15 DIAGNOSIS — Z23 NEED FOR VACCINATION: ICD-10-CM

## 2024-10-15 DIAGNOSIS — Z12.31 ENCOUNTER FOR SCREENING MAMMOGRAM FOR MALIGNANT NEOPLASM OF BREAST: ICD-10-CM

## 2024-10-15 DIAGNOSIS — E03.9 HYPOTHYROIDISM, UNSPECIFIED TYPE: ICD-10-CM

## 2024-10-15 PROCEDURE — 90656 IIV3 VACC NO PRSV 0.5 ML IM: CPT | Performed by: FAMILY MEDICINE

## 2024-10-15 PROCEDURE — 99214 OFFICE O/P EST MOD 30 MIN: CPT | Performed by: FAMILY MEDICINE

## 2024-10-15 PROCEDURE — 3074F SYST BP LT 130 MM HG: CPT | Performed by: FAMILY MEDICINE

## 2024-10-15 PROCEDURE — 90471 IMMUNIZATION ADMIN: CPT | Performed by: FAMILY MEDICINE

## 2024-10-15 PROCEDURE — 3078F DIAST BP <80 MM HG: CPT | Performed by: FAMILY MEDICINE

## 2024-10-15 PROCEDURE — 1036F TOBACCO NON-USER: CPT | Performed by: FAMILY MEDICINE

## 2024-10-15 RX ORDER — ESCITALOPRAM OXALATE 10 MG/1
10 TABLET ORAL DAILY
Qty: 90 TABLET | Refills: 1 | Status: SHIPPED | OUTPATIENT
Start: 2024-10-15

## 2024-10-15 RX ORDER — LEVOTHYROXINE SODIUM 75 UG/1
75 TABLET ORAL DAILY
Qty: 90 TABLET | Refills: 3 | Status: SHIPPED | OUTPATIENT
Start: 2024-10-15 | End: 2025-10-15

## 2024-10-15 RX ORDER — METOPROLOL SUCCINATE 25 MG/1
25 TABLET, EXTENDED RELEASE ORAL DAILY
Qty: 90 TABLET | Refills: 0 | Status: SHIPPED | OUTPATIENT
Start: 2024-10-15

## 2024-10-15 RX ORDER — PANTOPRAZOLE SODIUM 20 MG/1
20 TABLET, DELAYED RELEASE ORAL
Qty: 90 TABLET | Refills: 3 | Status: SHIPPED | OUTPATIENT
Start: 2024-10-15 | End: 2025-10-15

## 2024-10-15 ASSESSMENT — PATIENT HEALTH QUESTIONNAIRE - PHQ9
SUM OF ALL RESPONSES TO PHQ9 QUESTIONS 1 AND 2: 3
6. FEELING BAD ABOUT YOURSELF - OR THAT YOU ARE A FAILURE OR HAVE LET YOURSELF OR YOUR FAMILY DOWN: SEVERAL DAYS
4. FEELING TIRED OR HAVING LITTLE ENERGY: MORE THAN HALF THE DAYS
9. THOUGHTS THAT YOU WOULD BE BETTER OFF DEAD, OR OF HURTING YOURSELF: NOT AT ALL
SUM OF ALL RESPONSES TO PHQ QUESTIONS 1-9: 10
7. TROUBLE CONCENTRATING ON THINGS, SUCH AS READING THE NEWSPAPER OR WATCHING TELEVISION: SEVERAL DAYS
10. IF YOU CHECKED OFF ANY PROBLEMS, HOW DIFFICULT HAVE THESE PROBLEMS MADE IT FOR YOU TO DO YOUR WORK, TAKE CARE OF THINGS AT HOME, OR GET ALONG WITH OTHER PEOPLE: NOT DIFFICULT AT ALL
5. POOR APPETITE OR OVEREATING: NOT AT ALL
1. LITTLE INTEREST OR PLEASURE IN DOING THINGS: MORE THAN HALF THE DAYS
3. TROUBLE FALLING OR STAYING ASLEEP OR SLEEPING TOO MUCH: MORE THAN HALF THE DAYS
8. MOVING OR SPEAKING SO SLOWLY THAT OTHER PEOPLE COULD HAVE NOTICED. OR THE OPPOSITE, BEING SO FIGETY OR RESTLESS THAT YOU HAVE BEEN MOVING AROUND A LOT MORE THAN USUAL: SEVERAL DAYS
2. FEELING DOWN, DEPRESSED OR HOPELESS: SEVERAL DAYS

## 2024-10-15 NOTE — PROGRESS NOTES
Chief complaint:   Chief Complaint   Patient presents with    Follow-up       HPI:  Magaly Louie is a 44 y.o. female who presents for evaluation of   depression. She has been having more SE and is unsure if the antidepressant is causing her to feel this way or if it is inadequately treated depression. Her father passed away this past summer.     Wants to lose weight and has been working on this. She is interested in seeing a dietician again.     She takes Pantoprazole daily 20 mg and this is helping her.     Physical exam:  /72   Temp 35.8 °C (96.4 °F)   Wt 112 kg (246 lb)   BMI 36.33 kg/m²   General: NAD, well appearing female  Heart: RRR, no mumur appreciated  Lungs: CTAB, no wheezes, rales, rhonchi    Assessment/Plan   Problem List Items Addressed This Visit       Heartburn    Relevant Medications    pantoprazole (Protonix) 20 mg EC tablet    DIANNE (generalized anxiety disorder)    Relevant Medications    escitalopram (Lexapro) 10 mg tablet    Hypertension    Relevant Medications    metoprolol succinate XL (Toprol-XL) 25 mg 24 hr tablet    Other Relevant Orders    TSH with reflex to Free T4 if abnormal    CBC    Comprehensive Metabolic Panel    Lipid Panel    Hypothyroidism    Relevant Medications    levothyroxine (Synthroid, Levoxyl) 75 mcg tablet    Other Relevant Orders    TSH with reflex to Free T4 if abnormal    Lipid Panel    Morbid obesity (Multi) - Primary    Relevant Orders    Referral to Nutrition Services    TSH with reflex to Free T4 if abnormal    CBC    Comprehensive Metabolic Panel    Lipid Panel     Other Visit Diagnoses       Need for vaccination        Relevant Orders    Flu vaccine, trivalent, preservative free, age 6 months and greater (Fluraix/Fluzone/Flulaval) (Completed)    Encounter for screening mammogram for malignant neoplasm of breast        Relevant Orders    BI mammo bilateral screening tomosynthesis        Will decrease Lexapro to 10 mg and check labs today, follow up in  1 mo, if not improved, will consider switching to alternative SSRI.   Mammogram ordered   Influenza vaccination given in office today  Medications refilled as above.     Magaly Rivera, DO

## 2024-10-28 ENCOUNTER — APPOINTMENT (OUTPATIENT)
Dept: BEHAVIORAL HEALTH | Facility: CLINIC | Age: 44
End: 2024-10-28
Payer: COMMERCIAL

## 2024-10-28 DIAGNOSIS — F41.1 GAD (GENERALIZED ANXIETY DISORDER): ICD-10-CM

## 2024-10-28 DIAGNOSIS — F33.0 MILD EPISODE OF RECURRENT MAJOR DEPRESSIVE DISORDER (CMS-HCC): ICD-10-CM

## 2024-10-28 PROCEDURE — 90834 PSYTX W PT 45 MINUTES: CPT | Performed by: PSYCHOLOGIST

## 2024-11-07 ENCOUNTER — HOSPITAL ENCOUNTER (OUTPATIENT)
Dept: RADIOLOGY | Facility: CLINIC | Age: 44
Discharge: HOME | End: 2024-11-07
Payer: COMMERCIAL

## 2024-11-07 ENCOUNTER — LAB (OUTPATIENT)
Dept: LAB | Facility: LAB | Age: 44
End: 2024-11-07
Payer: COMMERCIAL

## 2024-11-07 DIAGNOSIS — E66.01 MORBID OBESITY (MULTI): ICD-10-CM

## 2024-11-07 DIAGNOSIS — Z12.31 ENCOUNTER FOR SCREENING MAMMOGRAM FOR MALIGNANT NEOPLASM OF BREAST: ICD-10-CM

## 2024-11-07 DIAGNOSIS — I10 PRIMARY HYPERTENSION: ICD-10-CM

## 2024-11-07 DIAGNOSIS — E03.9 HYPOTHYROIDISM, UNSPECIFIED TYPE: ICD-10-CM

## 2024-11-07 LAB
ALBUMIN SERPL BCP-MCNC: 4.1 G/DL (ref 3.4–5)
ALP SERPL-CCNC: 71 U/L (ref 33–110)
ALT SERPL W P-5'-P-CCNC: 21 U/L (ref 7–45)
ANION GAP SERPL CALC-SCNC: 11 MMOL/L (ref 10–20)
AST SERPL W P-5'-P-CCNC: 17 U/L (ref 9–39)
BILIRUB SERPL-MCNC: 0.4 MG/DL (ref 0–1.2)
BUN SERPL-MCNC: 12 MG/DL (ref 6–23)
CALCIUM SERPL-MCNC: 9 MG/DL (ref 8.6–10.6)
CHLORIDE SERPL-SCNC: 103 MMOL/L (ref 98–107)
CHOLEST SERPL-MCNC: 242 MG/DL (ref 0–199)
CHOLESTEROL/HDL RATIO: 4.1
CO2 SERPL-SCNC: 28 MMOL/L (ref 21–32)
CREAT SERPL-MCNC: 0.68 MG/DL (ref 0.5–1.05)
EGFRCR SERPLBLD CKD-EPI 2021: >90 ML/MIN/1.73M*2
ERYTHROCYTE [DISTWIDTH] IN BLOOD BY AUTOMATED COUNT: 12.6 % (ref 11.5–14.5)
GLUCOSE SERPL-MCNC: 92 MG/DL (ref 74–99)
HCT VFR BLD AUTO: 42.5 % (ref 36–46)
HDLC SERPL-MCNC: 58.9 MG/DL
HGB BLD-MCNC: 14 G/DL (ref 12–16)
LDLC SERPL CALC-MCNC: 156 MG/DL
MCH RBC QN AUTO: 30 PG (ref 26–34)
MCHC RBC AUTO-ENTMCNC: 32.9 G/DL (ref 32–36)
MCV RBC AUTO: 91 FL (ref 80–100)
NON HDL CHOLESTEROL: 183 MG/DL (ref 0–149)
NRBC BLD-RTO: 0 /100 WBCS (ref 0–0)
PLATELET # BLD AUTO: 287 X10*3/UL (ref 150–450)
POTASSIUM SERPL-SCNC: 4.2 MMOL/L (ref 3.5–5.3)
PROT SERPL-MCNC: 7.1 G/DL (ref 6.4–8.2)
RBC # BLD AUTO: 4.67 X10*6/UL (ref 4–5.2)
SODIUM SERPL-SCNC: 138 MMOL/L (ref 136–145)
TRIGL SERPL-MCNC: 134 MG/DL (ref 0–149)
TSH SERPL-ACNC: 3.04 MIU/L (ref 0.44–3.98)
VLDL: 27 MG/DL (ref 0–40)
WBC # BLD AUTO: 5.4 X10*3/UL (ref 4.4–11.3)

## 2024-11-07 PROCEDURE — 85027 COMPLETE CBC AUTOMATED: CPT

## 2024-11-07 PROCEDURE — 36415 COLL VENOUS BLD VENIPUNCTURE: CPT

## 2024-11-07 PROCEDURE — 80061 LIPID PANEL: CPT

## 2024-11-07 PROCEDURE — 84443 ASSAY THYROID STIM HORMONE: CPT

## 2024-11-07 PROCEDURE — 77067 SCR MAMMO BI INCL CAD: CPT

## 2024-11-07 PROCEDURE — 80053 COMPREHEN METABOLIC PANEL: CPT

## 2024-11-11 ENCOUNTER — APPOINTMENT (OUTPATIENT)
Dept: BEHAVIORAL HEALTH | Facility: CLINIC | Age: 44
End: 2024-11-11
Payer: COMMERCIAL

## 2024-11-11 DIAGNOSIS — F41.1 GAD (GENERALIZED ANXIETY DISORDER): ICD-10-CM

## 2024-11-11 DIAGNOSIS — R92.8 ABNORMAL MAMMOGRAM: Primary | ICD-10-CM

## 2024-11-11 DIAGNOSIS — F33.0 MILD EPISODE OF RECURRENT MAJOR DEPRESSIVE DISORDER (CMS-HCC): ICD-10-CM

## 2024-11-11 PROCEDURE — 90837 PSYTX W PT 60 MINUTES: CPT | Performed by: PSYCHOLOGIST

## 2024-11-11 NOTE — PROGRESS NOTES
Start time: 4:05pm  End time: 4:58pm    The patient was informed of the current need to conduct treatment via telephone or telehealth due to Covid-19 pandemic. Patient consented to the use of the platform that may not be HIPAA compliant. I have confirmed the patient's identity via the following (minimum of three) acceptable identifiers as per  Policy PH-9:   1. Last 4 of social:   2. :   3. Address:    Telephone/Televideo Informed Consent for Psychotherapy was reviewed with the patient as follows:  There are potential benefits and risks of the use of telephone or video-conferencing that differ from in-person sessions. Specifically, the telephone or televideo system we are using may not be HIPAA compliant and may present limits to patient confidentiality. Confidentiality still applies for telepsychology services, and nobody will record the session without your permission. You agree to use the telephone or video-conferencing platform selected for our virtual sessions, and I will explain how to use it.  1)             You need to use a webcam or smartphone during the session.  2)             It is important that you be in a quiet, private space that is free of distractions (including cell phone or other devices) during the session.  3)             It is important to use a secure internet connection rather than public/free Wi-Fi.  4)             It is important to be on time. If you need to cancel or change your tele-appointment, you must notify the psychologist in advance by phone or email.  5)             We need a back-up plan (e.g., phone number where you can be reached) to restart the session or to reschedule it, in the event of technical problems.  6)             We need a safety plan that includes at least one emergency contact and the closest emergency room to your location, in the event of a crisis situation.  7)             If you are not an adult, we need the permission of your parent or legal guardian  "(and their contact information) for you to participate in telepsychology sessions.  Understanding and verbal agreement was attested to by the patient.    Magaly reported that she would like her notes to remain blocked at this time.     Reason for care: Anxiety; Depression  Method of therapy: Supportive; CBT  Therapy summary: Magaly reported that she has been having \"terrible anxiety.\" She feels this is related to her medication changes. She discussed taking time to check-in with herself and trying to understand if there is a reason she is feeling this way. She processed a few experiences recently that have led to increase in her anxiety.     Recent stressors: issues with clothes fitting (having to size up), finding out she has higher cholesterol, reminders of past experiences with her father, election results     She noted that she finds she is beating herself up and getting stuck \"in her head.\" She has attempted to use Insight Timer. She has not found this to be helpful but finding breathing exercises to be helpful. She is also sleeping well. She processed \"bad dreams.\" She shared about recurring dream and how she responds to it. She stated that she feels a lot of her anxiety is related to being overwhelmed and things that are outside of her control.     We worked to set small goals related to her feelings of being overwhelmed.     Action plan: practice breathing exercises 2-5 minutes; identify negative thinking and reframe with positive affirmation 3x; increase activity 2x for 15 minutes    Continued: processing grief related to father's passing; practice relaxation daily (I.e., deep breathing, PMR); focus on motivation through creating schedule and routine (behavioral activation); continue walking    She denies SI/HI/AVH. We will follow-up in two weeks.  Identified goals/objectives: Processing grief; engaging in more structured relaxation; Continue engaging in self-care activities; identify workable schedule " and routine (continue increasing activity); Continue exercising; reframe negative thinking with writing  Response to therapy: Engaged  Treatment plan and process: Continue with above stated tx goals; Psycho-education on anxiety management strategies; Goal setting for health and wellness; Schedule and Routine exploration; Education around decision-making; Processing grief; Providing education around grief and loss

## 2024-11-12 ENCOUNTER — APPOINTMENT (OUTPATIENT)
Dept: PRIMARY CARE | Facility: CLINIC | Age: 44
End: 2024-11-12

## 2024-11-12 VITALS
HEART RATE: 74 BPM | OXYGEN SATURATION: 99 % | TEMPERATURE: 97.8 F | WEIGHT: 246 LBS | RESPIRATION RATE: 18 BRPM | DIASTOLIC BLOOD PRESSURE: 80 MMHG | BODY MASS INDEX: 36.33 KG/M2 | SYSTOLIC BLOOD PRESSURE: 126 MMHG

## 2024-11-12 DIAGNOSIS — F41.1 GAD (GENERALIZED ANXIETY DISORDER): ICD-10-CM

## 2024-11-12 DIAGNOSIS — R92.8 ABNORMAL MAMMOGRAM OF LEFT BREAST: ICD-10-CM

## 2024-11-12 DIAGNOSIS — F32.9 MAJOR DEPRESSIVE EPISODE: Primary | ICD-10-CM

## 2024-11-12 PROBLEM — F41.9 ANXIETY: Status: RESOLVED | Noted: 2023-03-16 | Resolved: 2024-11-12

## 2024-11-12 PROCEDURE — 3079F DIAST BP 80-89 MM HG: CPT | Performed by: FAMILY MEDICINE

## 2024-11-12 PROCEDURE — 3074F SYST BP LT 130 MM HG: CPT | Performed by: FAMILY MEDICINE

## 2024-11-12 PROCEDURE — 99213 OFFICE O/P EST LOW 20 MIN: CPT | Performed by: FAMILY MEDICINE

## 2024-11-12 PROCEDURE — 1036F TOBACCO NON-USER: CPT | Performed by: FAMILY MEDICINE

## 2024-11-12 NOTE — PROGRESS NOTES
Chief complaint:   Chief Complaint   Patient presents with    Follow-up     4 week follow up       HPI:  Magaly Louie is a 44 y.o. female who presents for evaluation of her depression. She had a dose decrease for her Lexapro to 10 mg due to concern for SE. She has chronic headaches since decreasing the Lexapro dosing. She sleeping soundly with more dreams. She is more exhausted during the day. Her anxiety has worsened. She has continued sadness. She has an increased sex drive.     She has tried Zoloft for depression, late 20's had panic attacks, tried Paxil and was on it for a while then started Lexapro. She has had Bupropion which helped with Paxil but with Lexapro made her want to crawl out of her skin. She has tried Effexor as well in the past, was very young.    She had her mammogram and needs diagnostic left mammogram with possible US.     Physical exam:  /80 (BP Location: Right arm, Patient Position: Sitting)   Pulse 74   Temp 36.6 °C (97.8 °F)   Resp 18   Wt 112 kg (246 lb)   LMP  (LMP Unknown)   SpO2 99%   BMI 36.33 kg/m²   General: NAD, well appearing female  Heart: RRR, no mumur appreciated  Lungs: CTAB, no wheezes, rales, rhonchi  Abdomen: soft, non tender, normoactive BS, no organomegaly  Extremities: No LE edema    Assessment/Plan   Problem List Items Addressed This Visit       DIANNE (generalized anxiety disorder)    Relevant Medications    vortioxetine (Trintellix) 5 mg tablet tablet    Major depressive episode - Primary    Relevant Medications    vortioxetine (Trintellix) 5 mg tablet tablet     Other Visit Diagnoses       Abnormal mammogram of left breast            Will decrease Lexapro to 5 mg for 4 days then stop, after 1 week start Trintellix 5 mg PO daily and increase to 10 mg after 1 week. Follow up in 4-6 weeks for re-evaluation, sooner as needed    Discussed results of the screening mammogram with her in office today. Diagnostic mammogram for the left breast has been ordered for  her.     Magaly Rivera, DO

## 2024-11-14 ENCOUNTER — NUTRITION (OUTPATIENT)
Dept: NUTRITION | Facility: CLINIC | Age: 44
End: 2024-11-14
Payer: COMMERCIAL

## 2024-11-14 VITALS — WEIGHT: 246.91 LBS | HEIGHT: 69 IN | BODY MASS INDEX: 36.57 KG/M2

## 2024-11-14 DIAGNOSIS — E66.01 MORBID OBESITY (MULTI): Primary | ICD-10-CM

## 2024-11-14 PROCEDURE — 97802 MEDICAL NUTRITION INDIV IN: CPT | Performed by: DIETITIAN, REGISTERED

## 2024-11-14 NOTE — PROGRESS NOTES
"Nutrition Assessment     Reason for Visit:  Magaly Louie is a 44 y.o. female who presents for initial visit for weight loss.     Anthropometrics:  Anthropometrics  Height: 175.3 cm (5' 9\")  Weight: 112 kg (246 lb 14.6 oz)  BMI (Calculated): 36.45  IBW/kg (Dietitian Calculated): 66 kg  Weight Change  Weight History / % Weight Change: 10 lb, or 4%, wt gain in the last year.  Significant Weight Gain: Non-fluid related    Daily Weight  11/14/24 : 112 kg (246 lb 14.6 oz)  11/12/24 : 112 kg (246 lb)  10/15/24 : 112 kg (246 lb)  03/28/24 : 111 kg (245 lb)  03/07/24 : 111 kg (245 lb)  02/22/24 : 111 kg (245 lb 6 oz)  02/20/24 : 112 kg (246 lb 14.6 oz)  01/02/24 : 110 kg (242 lb)  11/02/23 : 107 kg (236 lb)  10/26/23 : 107 kg (236 lb)     Food And Nutrient Intake:  Food and Nutrient History  Energy Intake: Good > 75 %  Fluid Intake: Coffee with reduced fat creamer, occasional diet pop, water  GI Symptoms: increased gas (reports extreme gas from certain foods; lactose intolerance)  Sleep Duration/Quality: 7+ hrs disrupted     Food Intake  Meal 1: [employer] breakfast bar  Meal 2: Salad with oil and vinegar, chicken  Meal 3: gets off work around 9pm (eats dinner after work)- cheese, pickles, frozen pizza  Snacks: Beef sticks, protein bars    Food Preparation  Cooking: Patient  Grocery Shopping: Patient  Dining Out: 1 to 3 times a week (3x week. Mexican, pizza, and burgers)    Food And Nutrient Administration:    Diet Experience  Previous Diet / Nutrition Education / Counseling: Weight watchers, has had success with this in the past. History of restrictive type eating disorder.    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 Occasional walks. Pt reports low motivation to exercise d/t depression.      Beliefs and Attitudes  Beliefs and Attitudes: Emotions (Pt reports emotional eating)      Avoidance Behavior  Restrictive Eating: Yes (hx of " "restrictive eating)    Nutrition Focused Physical Exam:  Subcutaneous Fat Loss  Orbital Fat Pads: Well nourished (slightly bulging fat pads)  Buccal Fat Pads: Well nourished (full, rounded cheeks)  Triceps: Well nourished (ample fat tissue)  Ribs: Defer  Muscle Wasting  Temporalis: Well nourished (well-defined muscle)  Pectoralis (Clavicular Region): Well nourished (clavicle not visible)  Deltoid/Trapezius: Defer  Interosseous: Defer  Trapezius/Infraspinatus/Supraspinatus (Scapular Region): Defer  Edema  Edema: none      Energy Needs  Calculated Energy Needs Using Equations  Height: 175.3 cm (5' 9\")  Weight Used for Equation Calculations: 112 kg (246 lb 14.6 oz)  Oak- . Nathan Equation (Overweight or Obese Patients): 1839  Activity Factor: 1.3  Total Energy Needs: 2391  Estimated Energy Needs  Total Energy Estimated Needs (kCal): 1800 kCal  Method for Estimating Needs: -500 kcal per day for 1 lb wt loss/wk  Estimated Protein Needs  Total Protein Estimated Needs (g): 113 g      Nutrition Diagnosis   Malnutrition Diagnosis  Patient has Malnutrition Diagnosis: No  Nutrition Diagnosis  Patient has Nutrition Diagnosis: Yes  Diagnosis Status (1): New  Nutrition Diagnosis 1: Unintended weight gain  Related to (1): diet pattern  As Evidenced by (1): 10 lb wt gain in the last year    Nutrition Interventions/Recommendations   Food and Nutrition Delivery  Meals & Snacks: Carbohydrate-modified diet, Energy-modified diet, Fiber-modified diet, General Healthful Diet, Protein-modified diet, Modify schedule of foods/fluids, Modify Composition of Meals/Snacks  Nutrition Education  Nutrition Education Content: Content related nutrition education, Education on nutrition's influence on health, Physical activity guidance  Goals: Instructed on counting macronutrient intake, proper portion sizes, label reading and general healthful nutrition. Instructed on benefits of wt loss with diabetes and heart health. Discussed mindful eating, " slow gradual wt loss and goals beyond wt. Instructed pt to not skip meals - discussed this may lead to over eating later or snacking more than planned. Instructed on importance of physical activity for wt loss and maintenance of wt loss. Both verbal and written education provided in the one-on-one setting. Pt verbalized understanding of information provided. All questions answered to pt satisfaction throughout visit    Patient Instructions   Follow the Healthy Plate Method at meals- see handout.  This will help to increase your vegetable intake and decrease portion sizes of carbohydrates.  When eating starchy foods, try to eat whole grains like potato with the skin, whole grain breads and cereals, brown rices and pastas.  Include protein with all meals and snacks.  Lean protein are better for cholesterol levels such as chicken(no skin), fish (baked or broiled or grilled), low-fat dairy, eggs, and peanut butter or nuts.   - Protein drinks between meals such as Premier Protein, Muscle Milk or Fairlife can help curb appetite.  4.   Reduce your weight by 1-2# per week to reach your goal weight.     5.   Increase fiber to 25-30g per day to help keep you dinh and lower cholesterol levels.  You may consider a fiber supplement such as Benefiber or Metamucil everyday.    6.   Aim to drink 64 oz of water daily  7.   Aim for 30 minutes of exercise on most days.       Nutrition Monitoring and Evaluation   Food/Nutrient Related History Monitoring  Monitoring and Evaluation Plan: Energy intake, Meal/snack pattern, Fiber intake, Carbohydrate intake, Amount of food, Fluid intake, Protein intake  Criteria: 1800 kcal/day  Criteria: aim for at least 64 oz of water daily  Criteria: Aim for 3 meals/day with 1-2 snacks  Meal/Snack Pattern: Food variety, Estimated meal and snack pattern  Criteria: Follow plate method when planning meals (1/2 plate non-starchy vegetables, 1/4 plate lean protein, 1/4 plate carbohydrates).  Criteria:  Choose lean protein sources including chicken, turkey, seafood, and eggs. Be sure to include protein with each meal and snack  Criteria: Limit added sugar to less than 25 g per day  Criteria: Increase fiber from fruits, vegetables, whole grains, and beans/lentils  Additional Plan: Provided pt with the following handouts: wt loss tips, 1800/1500 calorie 5 day meal plan, choose your foods list, exercise, label reading, plate method  Knowledge/Beliefs/Attitudes  Monitoring and Evaluation Plan: Physical activity  Criteria: Encouraged regular physical activity including strength training as medically appropriate per AHA guidelines  Body Composition/Growth/Weight History  Monitoring and Evaluation Plan: Weight change  Weight Change: Weight change intent  Criteria: 1-2 lb wt loss per week    Time Spent  Prep time on day of patient encounter: 10 minutes  Time spent directly with patient, family or caregiver: 60 minutes  Additional Time Spent on Patient Care Activities: 0 minutes  Documentation Time: 15 minutes  Other Time Spent: 0 minutes  Total: 85 minutes        Readiness to Change : Good  Level of Understanding : Excellent  Anticipated Compliant : Good

## 2024-11-14 NOTE — PATIENT INSTRUCTIONS
Follow the Healthy Plate Method at meals- see handout.  This will help to increase your vegetable intake and decrease portion sizes of carbohydrates.  - Check out Epiphyte.Go [email or web address] or ProtoExchange.org for Mediterranean meal plans and recipes ideas.    When eating starchy foods, try to eat whole grains like potato with the skin, whole grain breads and cereals, brown rices and pastas.  Include protein with all meals and snacks.  Lean protein are better for cholesterol levels such as chicken(no skin), fish (baked or broiled or grilled), low-fat dairy, eggs, and peanut butter or nuts.   - Protein drinks between meals such as Premier Protein, Muscle Milk or Fairlife can help curb appetite.  4.   Reduce your weight by 1-2# per week to reach your goal weight.      -  Consider tracking your calorie intake on an amelia such as Troveometer, [employer]al or LoseEcho360 to track your calories.  Aim for 6475-7197 calories per day.     - Track macronutrients and aim for 30% of calories from protein(112g), 40% of calories from carbohydrate, and 30% from fat.   5.   Increase fiber to 25-30g per day to help keep you dinh and lower cholesterol levels.  You may consider a fiber supplement such as Benefiber or Metamucil everyday.    6.   Aim to drink 64 oz of water daily  7.   Aim for 30 minutes of exercise on most days.

## 2024-11-20 ENCOUNTER — PATIENT MESSAGE (OUTPATIENT)
Dept: PRIMARY CARE | Facility: CLINIC | Age: 44
End: 2024-11-20
Payer: COMMERCIAL

## 2024-11-20 DIAGNOSIS — J32.9 SINUSITIS, UNSPECIFIED CHRONICITY, UNSPECIFIED LOCATION: Primary | ICD-10-CM

## 2024-11-22 RX ORDER — DOXYCYCLINE 100 MG/1
100 CAPSULE ORAL 2 TIMES DAILY
Qty: 14 CAPSULE | Refills: 0 | Status: SHIPPED | OUTPATIENT
Start: 2024-11-22 | End: 2024-11-29

## 2024-11-25 ENCOUNTER — APPOINTMENT (OUTPATIENT)
Dept: BEHAVIORAL HEALTH | Facility: CLINIC | Age: 44
End: 2024-11-25
Payer: COMMERCIAL

## 2024-11-25 DIAGNOSIS — F33.0 MILD EPISODE OF RECURRENT MAJOR DEPRESSIVE DISORDER (CMS-HCC): ICD-10-CM

## 2024-11-25 DIAGNOSIS — F41.1 GAD (GENERALIZED ANXIETY DISORDER): ICD-10-CM

## 2024-11-25 PROCEDURE — 90834 PSYTX W PT 45 MINUTES: CPT | Performed by: PSYCHOLOGIST
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
## 2024-11-26 ENCOUNTER — HOSPITAL ENCOUNTER (OUTPATIENT)
Dept: RADIOLOGY | Facility: CLINIC | Age: 44
Discharge: HOME | End: 2024-11-26
Payer: COMMERCIAL

## 2024-11-26 ENCOUNTER — APPOINTMENT (OUTPATIENT)
Dept: PRIMARY CARE | Facility: CLINIC | Age: 44
End: 2024-11-26
Payer: COMMERCIAL

## 2024-11-26 VITALS — SYSTOLIC BLOOD PRESSURE: 124 MMHG | DIASTOLIC BLOOD PRESSURE: 78 MMHG | TEMPERATURE: 97.6 F

## 2024-11-26 DIAGNOSIS — R92.8 OTHER ABNORMAL AND INCONCLUSIVE FINDINGS ON DIAGNOSTIC IMAGING OF BREAST: ICD-10-CM

## 2024-11-26 DIAGNOSIS — J32.1 FRONTAL SINUSITIS, UNSPECIFIED CHRONICITY: Primary | ICD-10-CM

## 2024-11-26 DIAGNOSIS — R92.8 ABNORMAL MAMMOGRAM: ICD-10-CM

## 2024-11-26 PROCEDURE — 77061 BREAST TOMOSYNTHESIS UNI: CPT | Mod: LEFT SIDE | Performed by: STUDENT IN AN ORGANIZED HEALTH CARE EDUCATION/TRAINING PROGRAM

## 2024-11-26 PROCEDURE — 76642 ULTRASOUND BREAST LIMITED: CPT | Mod: LEFT SIDE | Performed by: STUDENT IN AN ORGANIZED HEALTH CARE EDUCATION/TRAINING PROGRAM

## 2024-11-26 PROCEDURE — 3074F SYST BP LT 130 MM HG: CPT | Performed by: FAMILY MEDICINE

## 2024-11-26 PROCEDURE — 76642 ULTRASOUND BREAST LIMITED: CPT | Mod: LT

## 2024-11-26 PROCEDURE — 77065 DX MAMMO INCL CAD UNI: CPT | Mod: LEFT SIDE | Performed by: STUDENT IN AN ORGANIZED HEALTH CARE EDUCATION/TRAINING PROGRAM

## 2024-11-26 PROCEDURE — 77065 DX MAMMO INCL CAD UNI: CPT | Mod: LT

## 2024-11-26 PROCEDURE — 99212 OFFICE O/P EST SF 10 MIN: CPT | Performed by: FAMILY MEDICINE

## 2024-11-26 PROCEDURE — 3078F DIAST BP <80 MM HG: CPT | Performed by: FAMILY MEDICINE

## 2024-11-26 PROCEDURE — 1036F TOBACCO NON-USER: CPT | Performed by: FAMILY MEDICINE

## 2024-11-26 PROCEDURE — 76981 USE PARENCHYMA: CPT | Mod: LT,RT

## 2024-11-26 NOTE — PROGRESS NOTES
Chief complaint:   Chief Complaint   Patient presents with    Follow up after sinus infection        HPI:  Magaly SALAMANCA Louie is a 44 y.o. female who presents for evaluation of sinus infection. She started the Doxycycline and is 75% improved. Her sx were pressure behind her eyes and eyebrows. She had some chills but no fevers.     To note, she is tolerating the Trintellix though does not feel well controlled    Physical exam:  /78 (BP Location: Left arm, Patient Position: Sitting)   Temp 36.4 °C (97.6 °F)   LMP  (LMP Unknown)   General: NAD, well appearing female  Heart: RRR, no mumur appreciated  Lungs: CTAB, no wheezes, rales, rhonchi  Psych: alert and oriented    Assessment/Plan   Problem List Items Addressed This Visit    None  Visit Diagnoses       Frontal sinusitis, unspecified chronicity    -  Primary        Improved, continue Doxycycline and complete 7 days course  Continue Trintellix and titrate as recommended, has follow up scheduled for monitoring    Magaly Rivera DO

## 2024-12-09 ENCOUNTER — APPOINTMENT (OUTPATIENT)
Dept: BEHAVIORAL HEALTH | Facility: CLINIC | Age: 44
End: 2024-12-09
Payer: COMMERCIAL

## 2024-12-09 DIAGNOSIS — F33.0 MILD EPISODE OF RECURRENT MAJOR DEPRESSIVE DISORDER (CMS-HCC): ICD-10-CM

## 2024-12-09 DIAGNOSIS — F41.1 GAD (GENERALIZED ANXIETY DISORDER): ICD-10-CM

## 2024-12-09 PROCEDURE — 90834 PSYTX W PT 45 MINUTES: CPT | Performed by: PSYCHOLOGIST

## 2024-12-09 NOTE — PROGRESS NOTES
Start time: 10:06am  End time: 10:55am    The patient was informed of the current need to conduct treatment via telephone or telehealth due to Covid-19 pandemic. Patient consented to the use of the platform that may not be HIPAA compliant. I have confirmed the patient's identity via the following (minimum of three) acceptable identifiers as per  Policy PH-9:   1. Last 4 of social:   2. :   3. Address:    Telephone/Televideo Informed Consent for Psychotherapy was reviewed with the patient as follows:  There are potential benefits and risks of the use of telephone or video-conferencing that differ from in-person sessions. Specifically, the telephone or televideo system we are using may not be HIPAA compliant and may present limits to patient confidentiality. Confidentiality still applies for telepsychology services, and nobody will record the session without your permission. You agree to use the telephone or video-conferencing platform selected for our virtual sessions, and I will explain how to use it.  1)             You need to use a webcam or smartphone during the session.  2)             It is important that you be in a quiet, private space that is free of distractions (including cell phone or other devices) during the session.  3)             It is important to use a secure internet connection rather than public/free Wi-Fi.  4)             It is important to be on time. If you need to cancel or change your tele-appointment, you must notify the psychologist in advance by phone or email.  5)             We need a back-up plan (e.g., phone number where you can be reached) to restart the session or to reschedule it, in the event of technical problems.  6)             We need a safety plan that includes at least one emergency contact and the closest emergency room to your location, in the event of a crisis situation.  7)             If you are not an adult, we need the permission of your parent or legal guardian  "(and their contact information) for you to participate in telepsychology sessions.  Understanding and verbal agreement was attested to by the patient.    Magaly reported that she would like her notes to remain blocked at this time.     Reason for care: Anxiety; Depression  Method of therapy: Supportive; CBT  Therapy summary: Magaly reported that things have felt calmer recently. She went for her mammogram and ultrasound and \"everything ended up being fine.\" She is continuing to grieve the loss of the father and reported that the holidays have been difficult. She decorated her father's grave recently.     She processed work-related stressors as well. She described feeling irritated by a co-worker. We explored opposite action to anger and ways to resolve this conflict.     She stated that it can be difficult to be in her grief process while the world is moving on around her. She continued to process grief during this time and noted that she is noticing some avoidance of connecting with others.     She has been practicing Leaves on a Stream and reviewing material on Radical Acceptance and thought reframing. We will follow-up in one week and continue reviewing. She stated that her side effects from her medication changes have subsided and she is feeling better overall.     Action plan: practice breathing exercises 2-5 minutes; identify negative thinking and reframe with positive affirmation 3x; increase activity when able 2x for 15 minutes    Continued: processing grief related to father's passing; practice relaxation daily (I.e., deep breathing, PMR); focus on motivation through creating schedule and routine (behavioral activation); continue walking    She denies SI/HI/AVH. We will follow-up in one week.   Identified goals/objectives: Processing grief; engaging in more structured relaxation; Continue engaging in self-care activities; identify workable schedule and routine (continue increasing activity); Continue " exercising; reframe negative thinking with writing  Response to therapy: Engaged  Treatment plan and process: Continue with above stated tx goals; Psycho-education on anxiety management strategies; Goal setting for health and wellness; Schedule and Routine exploration; Education around decision-making; Processing grief; Providing education around grief and loss

## 2024-12-10 ENCOUNTER — APPOINTMENT (OUTPATIENT)
Dept: PRIMARY CARE | Facility: CLINIC | Age: 44
End: 2024-12-10
Payer: COMMERCIAL

## 2024-12-10 VITALS
DIASTOLIC BLOOD PRESSURE: 80 MMHG | BODY MASS INDEX: 36.48 KG/M2 | SYSTOLIC BLOOD PRESSURE: 126 MMHG | WEIGHT: 247 LBS | TEMPERATURE: 96.8 F

## 2024-12-10 DIAGNOSIS — F32.9 MAJOR DEPRESSIVE EPISODE: ICD-10-CM

## 2024-12-10 DIAGNOSIS — J01.00 SUBACUTE MAXILLARY SINUSITIS: Primary | ICD-10-CM

## 2024-12-10 DIAGNOSIS — F41.1 GAD (GENERALIZED ANXIETY DISORDER): ICD-10-CM

## 2024-12-10 PROCEDURE — 99214 OFFICE O/P EST MOD 30 MIN: CPT | Performed by: FAMILY MEDICINE

## 2024-12-10 PROCEDURE — 3079F DIAST BP 80-89 MM HG: CPT | Performed by: FAMILY MEDICINE

## 2024-12-10 PROCEDURE — 1036F TOBACCO NON-USER: CPT | Performed by: FAMILY MEDICINE

## 2024-12-10 PROCEDURE — 3074F SYST BP LT 130 MM HG: CPT | Performed by: FAMILY MEDICINE

## 2024-12-10 RX ORDER — DOXYCYCLINE 100 MG/1
100 CAPSULE ORAL 2 TIMES DAILY
Qty: 14 CAPSULE | Refills: 0 | Status: SHIPPED | OUTPATIENT
Start: 2024-12-10 | End: 2024-12-17

## 2024-12-10 NOTE — PROGRESS NOTES
Chief complaint:   Chief Complaint   Patient presents with    1 month follow up       HPI:  Magaly SALAMANCA Liban is a 44 y.o. female who presents for evaluation of her anxiety and depression sx. She is tolerating the Trintellix and has been on 10 mg for approx 1 week at this time. She states while off her medications she had a couple panic attacks though did not treat them with anything. She is working on weight loss through healthy habits.     Additionally had improvement with Doxycycline and when she stopped she had return of congestion and pressure left maxillary sinus and ear discomfort.     Physical exam:  /80 (BP Location: Left arm, Patient Position: Sitting)   Temp 36 °C (96.8 °F)   Wt 112 kg (247 lb)   BMI 36.48 kg/m²   General: NAD, well appearing female  Head: normocephalic  Eyes: white sclera  Ears: TM normal bilaterally  Nose: moist, left nare with mucus and increased swelling  Mouth: moist  Heart: RRR  Lungs: CTAB, no wheezes, rales, rhonchi  Psych: alert and oriented, mood and affect congruent    Assessment/Plan   Problem List Items Addressed This Visit       DIANNE (generalized anxiety disorder)    Relevant Medications    vortioxetine (Trintellix) 10 mg tablet tablet    Major depressive episode    Relevant Medications    vortioxetine (Trintellix) 10 mg tablet tablet     Other Visit Diagnoses       Subacute maxillary sinusitis    -  Primary    Relevant Medications    doxycycline (Vibramycin) 100 mg capsule        Will continue Trintellix 10 mg PO daily, if needing dose adjustment, she will need to follow up in 4 weeks, if stable, will follow up in 3-6 mo.     For her continued sinus sx which improved then returned upon completion of the abx, will extend abx therapy, if sx persist, will consider ENT referral.     Magaly Rivera DO

## 2024-12-16 ENCOUNTER — APPOINTMENT (OUTPATIENT)
Dept: BEHAVIORAL HEALTH | Facility: CLINIC | Age: 44
End: 2024-12-16
Payer: COMMERCIAL

## 2024-12-16 DIAGNOSIS — F41.1 GAD (GENERALIZED ANXIETY DISORDER): ICD-10-CM

## 2024-12-16 DIAGNOSIS — F33.0 MILD EPISODE OF RECURRENT MAJOR DEPRESSIVE DISORDER (CMS-HCC): ICD-10-CM

## 2024-12-16 PROCEDURE — 90834 PSYTX W PT 45 MINUTES: CPT | Performed by: PSYCHOLOGIST

## 2024-12-16 NOTE — PROGRESS NOTES
Start time: 10:05am  End time: 10:51am    The patient was informed of the current need to conduct treatment via telephone or telehealth due to Covid-19 pandemic. Patient consented to the use of the platform that may not be HIPAA compliant. I have confirmed the patient's identity via the following (minimum of three) acceptable identifiers as per  Policy PH-9:   1. Last 4 of social:   2. :   3. Address:    Telephone/Televideo Informed Consent for Psychotherapy was reviewed with the patient as follows:  There are potential benefits and risks of the use of telephone or video-conferencing that differ from in-person sessions. Specifically, the telephone or televideo system we are using may not be HIPAA compliant and may present limits to patient confidentiality. Confidentiality still applies for telepsychology services, and nobody will record the session without your permission. You agree to use the telephone or video-conferencing platform selected for our virtual sessions, and I will explain how to use it.  1)             You need to use a webcam or smartphone during the session.  2)             It is important that you be in a quiet, private space that is free of distractions (including cell phone or other devices) during the session.  3)             It is important to use a secure internet connection rather than public/free Wi-Fi.  4)             It is important to be on time. If you need to cancel or change your tele-appointment, you must notify the psychologist in advance by phone or email.  5)             We need a back-up plan (e.g., phone number where you can be reached) to restart the session or to reschedule it, in the event of technical problems.  6)             We need a safety plan that includes at least one emergency contact and the closest emergency room to your location, in the event of a crisis situation.  7)             If you are not an adult, we need the permission of your parent or legal guardian  (and their contact information) for you to participate in telepsychology sessions.  Understanding and verbal agreement was attested to by the patient.    Magaly reported that she would like her notes to remain blocked at this time.     Reason for care: Anxiety; Depression  Method of therapy: Supportive; CBT  Therapy summary: Magaly processed the last week. She stated that today is her parents' wedding anniversary and she spoke to her mother on the phone this morning. She discussed feeling upset after this phone call and her mother sending her pictures and videos over the phone. She explored why she feels she has such a strong emotional response when she speaks to her mother. She discussed helping support her mother while also supporting herself.     She processed feeling as though her mother has aged and struggling feelings of anger toward her mother.     She created plan for how to approach her grief this season. She stated that she would like to be mindful  and stay present in the moment. She noted that she has a lot of free time coming up and that she plans to make a schedule for herself. We reviewed behavioral activation activities of value, pleasure and mastery.     Action plan: practice breathing exercises 2-5 minutes; identify negative thinking and reframe with positive affirmation 3x; increase activity when able 2x for 15 minutes; Follow plan during free time over holidays     Continued: processing grief related to father's passing; practice relaxation daily (I.e., deep breathing, PMR); focus on motivation through creating schedule and routine (behavioral activation); continue walking    She denies SI/HI/AVH. We will follow-up in one week.   Identified goals/objectives: Processing grief; engaging in more structured relaxation; Continue engaging in self-care activities; identify workable schedule and routine (continue increasing activity); Continue exercising; reframe negative thinking with  writing  Response to therapy: Engaged  Treatment plan and process: Continue with above stated tx goals; Psycho-education on anxiety management strategies; Goal setting for health and wellness; Schedule and Routine exploration; Education around decision-making; Processing grief; Providing education around grief and loss

## 2024-12-19 ENCOUNTER — PATIENT MESSAGE (OUTPATIENT)
Dept: PRIMARY CARE | Facility: CLINIC | Age: 44
End: 2024-12-19
Payer: COMMERCIAL

## 2024-12-19 DIAGNOSIS — J32.9 SINUSITIS, UNSPECIFIED CHRONICITY, UNSPECIFIED LOCATION: Primary | ICD-10-CM

## 2025-01-09 ENCOUNTER — APPOINTMENT (OUTPATIENT)
Dept: NUTRITION | Facility: CLINIC | Age: 45
End: 2025-01-09
Payer: COMMERCIAL

## 2025-01-16 ENCOUNTER — APPOINTMENT (OUTPATIENT)
Dept: BEHAVIORAL HEALTH | Facility: CLINIC | Age: 45
End: 2025-01-16
Payer: COMMERCIAL

## 2025-01-16 DIAGNOSIS — F33.0 MILD EPISODE OF RECURRENT MAJOR DEPRESSIVE DISORDER (CMS-HCC): ICD-10-CM

## 2025-01-16 DIAGNOSIS — F41.1 GAD (GENERALIZED ANXIETY DISORDER): ICD-10-CM

## 2025-01-16 PROCEDURE — 90834 PSYTX W PT 45 MINUTES: CPT | Performed by: PSYCHOLOGIST
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
## 2025-01-27 ENCOUNTER — APPOINTMENT (OUTPATIENT)
Dept: BEHAVIORAL HEALTH | Facility: CLINIC | Age: 45
End: 2025-01-27
Payer: COMMERCIAL

## 2025-01-27 DIAGNOSIS — F43.21 GRIEF: ICD-10-CM

## 2025-01-27 DIAGNOSIS — F41.1 GAD (GENERALIZED ANXIETY DISORDER): ICD-10-CM

## 2025-01-27 DIAGNOSIS — F33.0 MILD EPISODE OF RECURRENT MAJOR DEPRESSIVE DISORDER (CMS-HCC): ICD-10-CM

## 2025-01-27 PROCEDURE — 90834 PSYTX W PT 45 MINUTES: CPT | Performed by: PSYCHOLOGIST

## 2025-01-27 NOTE — PROGRESS NOTES
Start time: 9:07am  End time: 9:57am    The patient was informed of the current need to conduct treatment via telephone or telehealth due to Covid-19 pandemic. Patient consented to the use of the platform that may not be HIPAA compliant. I have confirmed the patient's identity via the following (minimum of three) acceptable identifiers as per  Policy PH-9:   1. Last 4 of social:   2. :   3. Address:    Telephone/Televideo Informed Consent for Psychotherapy was reviewed with the patient as follows:  There are potential benefits and risks of the use of telephone or video-conferencing that differ from in-person sessions. Specifically, the telephone or televideo system we are using may not be HIPAA compliant and may present limits to patient confidentiality. Confidentiality still applies for telepsychology services, and nobody will record the session without your permission. You agree to use the telephone or video-conferencing platform selected for our virtual sessions, and I will explain how to use it.  1)             You need to use a webcam or smartphone during the session.  2)             It is important that you be in a quiet, private space that is free of distractions (including cell phone or other devices) during the session.  3)             It is important to use a secure internet connection rather than public/free Wi-Fi.  4)             It is important to be on time. If you need to cancel or change your tele-appointment, you must notify the psychologist in advance by phone or email.  5)             We need a back-up plan (e.g., phone number where you can be reached) to restart the session or to reschedule it, in the event of technical problems.  6)             We need a safety plan that includes at least one emergency contact and the closest emergency room to your location, in the event of a crisis situation.  7)             If you are not an adult, we need the permission of your parent or legal guardian  "(and their contact information) for you to participate in telepsychology sessions.  Understanding and verbal agreement was attested to by the patient.    Magaly reported that she would like her notes to remain blocked at this time.     Reason for care: Anxiety; Depression  Method of therapy: Supportive; CBT  Therapy summary: Magaly reported that she is doing \"ok.\" She recovered from hitting her head and sought recommendations from her physician. She has noticed some side effects from her medication but overall feel her mood and sleep are improving. We discussed tracking symptoms and side effects until she meets with her medication provider.    She is attempting to make a schedule during the week and follow a routine. She stated that Saturdays continue to be somewhat difficult as that is the day she used to spend with her dad.     She discussed recent trip with her family. She processed spending more 1:1 time with her mom and feeling similarities from spending time with her father. She processed spending time with her father while he was in the nursing home and things she continues to feel sad about the way things happened.    Continued symptoms of anxiety and grief. We will continue to process grief and review anxiety management strategies.     Action plan: practice breathing exercises 5 minutes; review thought records; Engage in PT exercises daily    Continued: processing grief related to father's passing; focus on motivation through creating schedule and routine (behavioral activation); continue walking    She denies SI/HI/AVH. We will follow-up in one week.   Identified goals/objectives: Processing grief; engaging in more structured relaxation; Continue engaging in self-care activities; identify workable schedule and routine (continue increasing activity); Continue exercising; reframe negative thinking with writing  Response to therapy: Engaged  Treatment plan and process: Continue with above stated tx goals; " Psycho-education on anxiety management strategies; Goal setting for health and wellness; Schedule and Routine exploration; Education around decision-making; Processing grief; Providing education around grief and loss

## 2025-02-10 ENCOUNTER — APPOINTMENT (OUTPATIENT)
Dept: BEHAVIORAL HEALTH | Facility: CLINIC | Age: 45
End: 2025-02-10
Payer: COMMERCIAL

## 2025-02-10 DIAGNOSIS — F41.1 GAD (GENERALIZED ANXIETY DISORDER): ICD-10-CM

## 2025-02-10 DIAGNOSIS — F43.21 GRIEF: ICD-10-CM

## 2025-02-10 DIAGNOSIS — F33.0 MILD EPISODE OF RECURRENT MAJOR DEPRESSIVE DISORDER (CMS-HCC): ICD-10-CM

## 2025-02-10 PROCEDURE — 90837 PSYTX W PT 60 MINUTES: CPT | Performed by: PSYCHOLOGIST

## 2025-02-10 NOTE — PROGRESS NOTES
Start time: 10:06am  End time: 10:59am    The patient was informed of the current need to conduct treatment via telephone or telehealth due to Covid-19 pandemic. Patient consented to the use of the platform that may not be HIPAA compliant. I have confirmed the patient's identity via the following (minimum of three) acceptable identifiers as per  Policy PH-9:   1. Last 4 of social:   2. :   3. Address:    Telephone/Televideo Informed Consent for Psychotherapy was reviewed with the patient as follows:  There are potential benefits and risks of the use of telephone or video-conferencing that differ from in-person sessions. Specifically, the telephone or televideo system we are using may not be HIPAA compliant and may present limits to patient confidentiality. Confidentiality still applies for telepsychology services, and nobody will record the session without your permission. You agree to use the telephone or video-conferencing platform selected for our virtual sessions, and I will explain how to use it.  1)             You need to use a webcam or smartphone during the session.  2)             It is important that you be in a quiet, private space that is free of distractions (including cell phone or other devices) during the session.  3)             It is important to use a secure internet connection rather than public/free Wi-Fi.  4)             It is important to be on time. If you need to cancel or change your tele-appointment, you must notify the psychologist in advance by phone or email.  5)             We need a back-up plan (e.g., phone number where you can be reached) to restart the session or to reschedule it, in the event of technical problems.  6)             We need a safety plan that includes at least one emergency contact and the closest emergency room to your location, in the event of a crisis situation.  7)             If you are not an adult, we need the permission of your parent or legal guardian  "(and their contact information) for you to participate in telepsychology sessions.  Understanding and verbal agreement was attested to by the patient.    Magaly reported that she would like her notes to remain blocked at this time.     Reason for care: Anxiety; Depression  Method of therapy: Supportive; CBT  Therapy summary: Magaly reported that she has had \"a couple things come up.\" She processed increase in intrusive thoughts. She described feeling irritated and experiencing passive SI in an intrusive thought. She stated that she had no intent or plan but was disturbed by this thinking. She stated that she talked with her  and was able to calm herself and identify the thought as intrusive. We reviewed intrusive thoughts worksheet and coping.    She processed recent stressors (e.g., financial stress and grief related stressors).     She discussed spending and how it feels like a reward, but ultimately leads to more stress. She discussed how her self-esteem relates to this. We reviewed ways of building self-esteem and extending her view of self beyond weight and body image. She plans to     Action plan: Identify daily affirmations, strengths or accomplishments to help increase self-esteem; use intrusive thought coping strategies if needed; track additional intrusive thoughts related to SI    Continued: processing grief related to father's passing    She denies SI/HI/AVH. We will follow-up in two weeks.    Identified goals/objectives: Processing grief; engaging in more structured relaxation; Continue engaging in self-care activities; identify workable schedule and routine (continue increasing activity); Continue exercising; reframe negative thinking with writing  Response to therapy: Engaged  Treatment plan and process: Continue with above stated tx goals; Psycho-education on anxiety management strategies; Goal setting for health and wellness; Schedule and Routine exploration; Education around " decision-making; Processing grief; Providing education around grief and loss

## 2025-02-14 ENCOUNTER — APPOINTMENT (OUTPATIENT)
Dept: OTOLARYNGOLOGY | Facility: CLINIC | Age: 45
End: 2025-02-14
Payer: COMMERCIAL

## 2025-02-14 DIAGNOSIS — J32.4 CHRONIC PANSINUSITIS: Primary | ICD-10-CM

## 2025-02-14 PROCEDURE — 99203 OFFICE O/P NEW LOW 30 MIN: CPT | Performed by: OTOLARYNGOLOGY

## 2025-02-14 NOTE — PROGRESS NOTES
Subjective   Patient ID: Magaly Louie is a 45 y.o. female who presents for Sinusitis.    HPI  New patient being seen for recurrent nasal congestion, sinus pressure particularly in left maxillary region as well as behind eyes. Recently completed multiple courses of doxycycline which provided relief initially but then symptoms returned.     Review of Systems   Constitutional: Negative.    HENT: nasal congestion and left maxillary sinus pressure  Respiratory: Negative.     Cardiovascular: Negative.    Neurological: Negative.      Physical Exam  General appearance: No acute distress. Normal facies. Symmetric facial movement. No gross lesions of the face are noted.  Ears:  The external ear structures appear normal. The ear canals patent and the tympanic membranes are intact without evidence of air-fluid levels, retraction, or congenital defects.    Nose:  Anterior rhinoscopy notes essentially a midline nasal septum. Examination is noted for normal healthy mucosal membranes without any evidence of lesions, polyps, or exudate.   Throat/Oral mucosa:  The tongue is normally mobile. There are no lesions on the gingiva, buccal, or oral mucosa. There are no oral cavity masses.  Neck:  The neck is negative for mass lymphadenopathy. The trachea and parotid are clear. The thyroid bed is grossly unremarkable. The salivary gland structures are grossly unremarkable.    Assessment/Plan   For recurrent sinus symptoms, will order dedicated CT Sinus. Will call patient with results and to discuss plan going forward.

## 2025-02-24 ENCOUNTER — APPOINTMENT (OUTPATIENT)
Dept: BEHAVIORAL HEALTH | Facility: CLINIC | Age: 45
End: 2025-02-24
Payer: COMMERCIAL

## 2025-02-24 DIAGNOSIS — F33.0 MILD EPISODE OF RECURRENT MAJOR DEPRESSIVE DISORDER (CMS-HCC): ICD-10-CM

## 2025-02-24 DIAGNOSIS — F41.1 GAD (GENERALIZED ANXIETY DISORDER): ICD-10-CM

## 2025-02-24 DIAGNOSIS — F43.21 GRIEF: ICD-10-CM

## 2025-02-24 PROCEDURE — 90837 PSYTX W PT 60 MINUTES: CPT | Performed by: PSYCHOLOGIST

## 2025-02-24 NOTE — PROGRESS NOTES
Start time: 10:04am  End time: 10:58am    The patient was informed of the current need to conduct treatment via telephone or telehealth due to Covid-19 pandemic. Patient consented to the use of the platform that may not be HIPAA compliant. I have confirmed the patient's identity via the following (minimum of three) acceptable identifiers as per  Policy PH-9:   1. Last 4 of social:   2. :   3. Address:    Telephone/Televideo Informed Consent for Psychotherapy was reviewed with the patient as follows:  There are potential benefits and risks of the use of telephone or video-conferencing that differ from in-person sessions. Specifically, the telephone or televideo system we are using may not be HIPAA compliant and may present limits to patient confidentiality. Confidentiality still applies for telepsychology services, and nobody will record the session without your permission. You agree to use the telephone or video-conferencing platform selected for our virtual sessions, and I will explain how to use it.  1)             You need to use a webcam or smartphone during the session.  2)             It is important that you be in a quiet, private space that is free of distractions (including cell phone or other devices) during the session.  3)             It is important to use a secure internet connection rather than public/free Wi-Fi.  4)             It is important to be on time. If you need to cancel or change your tele-appointment, you must notify the psychologist in advance by phone or email.  5)             We need a back-up plan (e.g., phone number where you can be reached) to restart the session or to reschedule it, in the event of technical problems.  6)             We need a safety plan that includes at least one emergency contact and the closest emergency room to your location, in the event of a crisis situation.  7)             If you are not an adult, we need the permission of your parent or legal guardian  "(and their contact information) for you to participate in telepsychology sessions.  Understanding and verbal agreement was attested to by the patient.    Magaly reported that she would like her notes to remain blocked at this time.     Reason for care: Anxiety; Depression  Method of therapy: Supportive; CBT  Therapy summary: Magaly visited the ENT and reported that she was feeling anxious about this but found that it went well. She discussed facing a stressor at work recently where she had to share goals. She stated that she felt uncomfortable sharing with her co-workers and feels that she has been \"putting on a face at work\" and pretending like she is ok. She described feeling more sad.     We explored both the cognitive side and behavioral side of her depression. She related her sadness to grief. She went through her father's belongings with her family and found that this stirred emotions. Additionally, she feels that celebrating her birthday without her father. She stated that she has felt positive effects from her medication (e.g., sleep improvement). He denies intrusive thoughts since our last session.    She processed wanting to \"be happier.\" We discussed emotions as states and how increase happiness. We reviewed happiness enhancing strategies.     She also reported increase in anticipatory anxiety. She described feeling fear that bad things will occur and noted that she has been using the STOP skill. We discussed identifying when things have gone well and possibly using imagery to help her imagine the best possible scenario instead of catastrophizing. She plans to utilize this to help with anxiety related to flying.     Action plan: Using imagery and coping for anxiety; Identify daily affirmations, strengths or accomplishments to help increase self-esteem    She denies SI/HI/AVH. We will follow-up in two weeks.    Identified goals/objectives: Processing grief; engaging in more structured relaxation; " Continue engaging in self-care activities; identify workable schedule and routine (continue increasing activity); Continue exercising; reframe negative thinking with writing  Response to therapy: Engaged  Treatment plan and process: Continue with above stated tx goals; Psycho-education on anxiety management strategies; Goal setting for health and wellness; Schedule and Routine exploration; Education around decision-making; Processing grief; Providing education around grief and loss

## 2025-02-27 ENCOUNTER — APPOINTMENT (OUTPATIENT)
Dept: PRIMARY CARE | Facility: CLINIC | Age: 45
End: 2025-02-27
Payer: COMMERCIAL

## 2025-02-27 VITALS
BODY MASS INDEX: 36.53 KG/M2 | HEART RATE: 70 BPM | TEMPERATURE: 97 F | WEIGHT: 241 LBS | OXYGEN SATURATION: 98 % | RESPIRATION RATE: 18 BRPM | SYSTOLIC BLOOD PRESSURE: 118 MMHG | DIASTOLIC BLOOD PRESSURE: 76 MMHG | HEIGHT: 68 IN

## 2025-02-27 DIAGNOSIS — F41.1 GAD (GENERALIZED ANXIETY DISORDER): ICD-10-CM

## 2025-02-27 DIAGNOSIS — F32.9 MAJOR DEPRESSIVE EPISODE: Primary | ICD-10-CM

## 2025-02-27 PROCEDURE — 1036F TOBACCO NON-USER: CPT

## 2025-02-27 PROCEDURE — 3074F SYST BP LT 130 MM HG: CPT

## 2025-02-27 PROCEDURE — 99214 OFFICE O/P EST MOD 30 MIN: CPT

## 2025-02-27 PROCEDURE — 3078F DIAST BP <80 MM HG: CPT

## 2025-02-27 PROCEDURE — 3008F BODY MASS INDEX DOCD: CPT

## 2025-02-27 RX ORDER — BUPROPION HYDROCHLORIDE 150 MG/1
150 TABLET ORAL DAILY
Qty: 45 TABLET | Refills: 0 | Status: SHIPPED | OUTPATIENT
Start: 2025-02-27 | End: 2025-04-13

## 2025-02-27 ASSESSMENT — PATIENT HEALTH QUESTIONNAIRE - PHQ9
10. IF YOU CHECKED OFF ANY PROBLEMS, HOW DIFFICULT HAVE THESE PROBLEMS MADE IT FOR YOU TO DO YOUR WORK, TAKE CARE OF THINGS AT HOME, OR GET ALONG WITH OTHER PEOPLE: NOT DIFFICULT AT ALL
2. FEELING DOWN, DEPRESSED OR HOPELESS: SEVERAL DAYS
1. LITTLE INTEREST OR PLEASURE IN DOING THINGS: SEVERAL DAYS
SUM OF ALL RESPONSES TO PHQ9 QUESTIONS 1 AND 2: 2

## 2025-02-27 NOTE — PROGRESS NOTES
"Subjective   Patient ID: Magaly Louie is a 45 y.o. female who presents for Follow-up (PCP MERE Rivera.  2 month follow up Trintellix 10 mg PO daily. States that she is still having some depression. Right before her menstruation she has Anxiety. She has been seeing a counselor bi-weekly. States her sleep has improved./).    Patient here today to follow up  Sleep has improved however depressive symptoms (lack of motivation, sadness)  Sees therapist regularly, feels relationship to be beneficial  Has been on multiple antidepressants over the course of years: lexapro, paroxitine, zoloft.  Has periodically augmented treatment with Wellbutrin, has tolerated variability depending upon base SSRI         Review of Systems    Objective   /76 (BP Location: Left arm, Patient Position: Sitting)   Pulse 70   Temp 36.1 °C (97 °F)   Resp 18   Ht 1.727 m (5' 8\")   Wt 109 kg (241 lb)   SpO2 98%   BMI 36.64 kg/m²     Physical Exam  Constitutional:       General: She is not in acute distress.     Appearance: Normal appearance. She is not ill-appearing.   Eyes:      General: No scleral icterus.  Cardiovascular:      Rate and Rhythm: Normal rate and regular rhythm.      Heart sounds: No murmur heard.     No friction rub. No gallop.   Pulmonary:      Effort: Pulmonary effort is normal. No respiratory distress.      Breath sounds: Normal breath sounds. No wheezing, rhonchi or rales.   Musculoskeletal:      Right lower leg: No edema.      Left lower leg: No edema.   Neurological:      General: No focal deficit present.      Mental Status: She is alert and oriented to person, place, and time.   Psychiatric:         Mood and Affect: Mood normal.         Behavior: Behavior normal.      Comments: Does not endorse any SI/HI         Assessment/Plan   Problem List Items Addressed This Visit             ICD-10-CM    DIANNE (generalized anxiety disorder) F41.1    Relevant Medications    vortioxetine (Trintellix) 20 mg tablet tablet    " Major depressive episode - Primary F32.9    Relevant Medications    buPROPion XL (Wellbutrin XL) 150 mg 24 hr tablet    vortioxetine (Trintellix) 20 mg tablet tablet   Will adjust antidepressant regimen at this time, decrease Trintellix to 10 mg/day given CYP inhibition associated with Wellbutrin.  Has tolerated Wellbutrin well overall discussed R/B/SE, recommend patient contact office should she develop any adverse effects  PHQ-9 in office today 8  Follow-up 1 month    Gilson Scott DO

## 2025-03-06 ENCOUNTER — HOSPITAL ENCOUNTER (OUTPATIENT)
Dept: RADIOLOGY | Facility: CLINIC | Age: 45
Discharge: HOME | End: 2025-03-06
Payer: COMMERCIAL

## 2025-03-06 ENCOUNTER — TELEPHONE (OUTPATIENT)
Dept: OTOLARYNGOLOGY | Facility: CLINIC | Age: 45
End: 2025-03-06
Payer: COMMERCIAL

## 2025-03-06 DIAGNOSIS — J32.4 CHRONIC PANSINUSITIS: ICD-10-CM

## 2025-03-06 PROCEDURE — 70486 CT MAXILLOFACIAL W/O DYE: CPT

## 2025-03-06 NOTE — RESULT ENCOUNTER NOTE
I spoke with the patient this evening.  Sinus CT scan completely normal.  Follow-up with dentistry to make sure no other worrisome issues as previously documented.  Thank you

## 2025-03-10 ENCOUNTER — APPOINTMENT (OUTPATIENT)
Dept: BEHAVIORAL HEALTH | Facility: CLINIC | Age: 45
End: 2025-03-10
Payer: COMMERCIAL

## 2025-03-10 DIAGNOSIS — F41.1 GAD (GENERALIZED ANXIETY DISORDER): ICD-10-CM

## 2025-03-10 DIAGNOSIS — I10 PRIMARY HYPERTENSION: ICD-10-CM

## 2025-03-10 DIAGNOSIS — F33.0 MILD EPISODE OF RECURRENT MAJOR DEPRESSIVE DISORDER (CMS-HCC): ICD-10-CM

## 2025-03-10 DIAGNOSIS — F32.9 MAJOR DEPRESSIVE EPISODE: ICD-10-CM

## 2025-03-10 DIAGNOSIS — F43.21 GRIEF: ICD-10-CM

## 2025-03-10 PROCEDURE — 90832 PSYTX W PT 30 MINUTES: CPT | Performed by: PSYCHOLOGIST

## 2025-03-10 RX ORDER — METOPROLOL SUCCINATE 25 MG/1
25 TABLET, EXTENDED RELEASE ORAL DAILY
Qty: 90 TABLET | Refills: 0 | Status: SHIPPED | OUTPATIENT
Start: 2025-03-10

## 2025-03-10 NOTE — PROGRESS NOTES
Start time: 10:05am  End time: 10:29am    The patient was informed of the current need to conduct treatment via telephone or telehealth due to Covid-19 pandemic. Patient consented to the use of the platform that may not be HIPAA compliant. I have confirmed the patient's identity via the following (minimum of three) acceptable identifiers as per  Policy PH-9:   1. Last 4 of social:   2. :   3. Address:    Telephone/Televideo Informed Consent for Psychotherapy was reviewed with the patient as follows:  There are potential benefits and risks of the use of telephone or video-conferencing that differ from in-person sessions. Specifically, the telephone or televideo system we are using may not be HIPAA compliant and may present limits to patient confidentiality. Confidentiality still applies for telepsychology services, and nobody will record the session without your permission. You agree to use the telephone or video-conferencing platform selected for our virtual sessions, and I will explain how to use it.  1)             You need to use a webcam or smartphone during the session.  2)             It is important that you be in a quiet, private space that is free of distractions (including cell phone or other devices) during the session.  3)             It is important to use a secure internet connection rather than public/free Wi-Fi.  4)             It is important to be on time. If you need to cancel or change your tele-appointment, you must notify the psychologist in advance by phone or email.  5)             We need a back-up plan (e.g., phone number where you can be reached) to restart the session or to reschedule it, in the event of technical problems.  6)             We need a safety plan that includes at least one emergency contact and the closest emergency room to your location, in the event of a crisis situation.  7)             If you are not an adult, we need the permission of your parent or legal guardian  (and their contact information) for you to participate in telepsychology sessions.  Understanding and verbal agreement was attested to by the patient.    Magaly reported that she would like her notes to remain blocked at this time.     Reason for care: Anxiety; Depression  Method of therapy: Supportive; CBT  Therapy summary: Magaly reported that she has not been feeling well and requested a shorter session than normal. She stated that she and her  became ill on their recent vacation and she has been working on healing and recovery. She discussed making the most out of her trip.    She stated that she has been noticing more depressive symptoms recently. She and her doctor are working on finding a new medication regimen for depression. She stated that she has not been as tearful but find that she has difficulty completing tasks, problems with motivation and is overall feeling low in her mood. We reviewed behavioral activation and setting small goals. She I encouraged her to continue identifying one good thing that has happened from the day and to continue identifying a daily affirmation.     We will follow-up with mood improving strategies. She discussed anxiety about calling off of work and identified cost and benefit of this.     Action plan: Identify daily affirmations, strengths or accomplishments to help increase self-esteem; Identify one good thing each day; setting small goals for behavioral activation when no longer sick    She denies SI/HI/AVH. We will follow-up in two weeks.    Identified goals/objectives: Processing grief; engaging in more structured relaxation; Continue engaging in self-care activities; identify workable schedule and routine (continue increasing activity); Continue exercising; reframe negative thinking with writing  Response to therapy: Engaged  Treatment plan and process: Continue with above stated tx goals; Psycho-education on anxiety management strategies; Goal setting for  health and wellness; Schedule and Routine exploration; Education around decision-making; Processing grief; Providing education around grief and loss

## 2025-03-13 ENCOUNTER — APPOINTMENT (OUTPATIENT)
Dept: PRIMARY CARE | Facility: CLINIC | Age: 45
End: 2025-03-13
Payer: COMMERCIAL

## 2025-03-24 ENCOUNTER — APPOINTMENT (OUTPATIENT)
Dept: BEHAVIORAL HEALTH | Facility: CLINIC | Age: 45
End: 2025-03-24
Payer: COMMERCIAL

## 2025-03-24 DIAGNOSIS — F41.1 GAD (GENERALIZED ANXIETY DISORDER): ICD-10-CM

## 2025-03-24 DIAGNOSIS — F43.21 GRIEF: ICD-10-CM

## 2025-03-24 DIAGNOSIS — F33.0 MILD EPISODE OF RECURRENT MAJOR DEPRESSIVE DISORDER (CMS-HCC): ICD-10-CM

## 2025-03-24 PROCEDURE — 90837 PSYTX W PT 60 MINUTES: CPT | Performed by: PSYCHOLOGIST

## 2025-03-24 NOTE — PROGRESS NOTES
Start time: 10:04am  End time: 10:59am    The patient was informed of the current need to conduct treatment via telephone or telehealth due to Covid-19 pandemic. Patient consented to the use of the platform that may not be HIPAA compliant. I have confirmed the patient's identity via the following (minimum of three) acceptable identifiers as per  Policy PH-9:   1. Last 4 of social:   2. :   3. Address:    Telephone/Televideo Informed Consent for Psychotherapy was reviewed with the patient as follows:  There are potential benefits and risks of the use of telephone or video-conferencing that differ from in-person sessions. Specifically, the telephone or televideo system we are using may not be HIPAA compliant and may present limits to patient confidentiality. Confidentiality still applies for telepsychology services, and nobody will record the session without your permission. You agree to use the telephone or video-conferencing platform selected for our virtual sessions, and I will explain how to use it.  1)             You need to use a webcam or smartphone during the session.  2)             It is important that you be in a quiet, private space that is free of distractions (including cell phone or other devices) during the session.  3)             It is important to use a secure internet connection rather than public/free Wi-Fi.  4)             It is important to be on time. If you need to cancel or change your tele-appointment, you must notify the psychologist in advance by phone or email.  5)             We need a back-up plan (e.g., phone number where you can be reached) to restart the session or to reschedule it, in the event of technical problems.  6)             We need a safety plan that includes at least one emergency contact and the closest emergency room to your location, in the event of a crisis situation.  7)             If you are not an adult, we need the permission of your parent or legal guardian  "(and their contact information) for you to participate in telepsychology sessions.  Understanding and verbal agreement was attested to by the patient.    Magaly reported that she would like her notes to remain blocked at this time.     Reason for care: Anxiety; Depression  Method of therapy: Supportive; CBT  Therapy summary: Magaly reported that she was sick for about three weeks and is working on healing. She reported very high anxiety since returning from vacation. She had a recent medication change and is mood tracking. She noted that prior to her cycle starting she often feels heightened anxiety. She has been coping by playing games on her phone and deep breathing. She noted having \"really weird dreams.\"     She processed heightened anxiety this morning. She and her  were working on a home improvement project and she noted that she was missing her father. She discussed often feeling defeated when things do not get accomplished or go as planned.     We continued to review behavioral activation for depression and discussed setting SMART goals. She reported that she has been trying to use the STOP skill when she is feeling defeated.     She has been working on writing one good thing that happens each day. She reported that she has been noticing her achievements and accomplishments. She explored rewarding interactions at work that help to make work more meaningful.     Action plan: engage in behavioral activation; creating a plan for Saturdays as they tend to bring up more grief; continue identifying one good thing per day and using affirmations; use anxiety management tools    She denies SI/HI/AVH. We will follow-up in two weeks.    Identified goals/objectives: Processing grief; engaging in more structured relaxation; Continue engaging in self-care activities; identify workable schedule and routine (continue increasing activity); Continue exercising; reframe negative thinking with writing  Response to " therapy: Engaged  Treatment plan and process: Continue with above stated tx goals; Psycho-education on anxiety management strategies; Goal setting for health and wellness; Schedule and Routine exploration; Education around decision-making; Processing grief; Providing education around grief and loss

## 2025-04-03 ENCOUNTER — APPOINTMENT (OUTPATIENT)
Dept: PRIMARY CARE | Facility: CLINIC | Age: 45
End: 2025-04-03
Payer: COMMERCIAL

## 2025-04-07 ENCOUNTER — APPOINTMENT (OUTPATIENT)
Dept: BEHAVIORAL HEALTH | Facility: CLINIC | Age: 45
End: 2025-04-07
Payer: COMMERCIAL

## 2025-04-07 DIAGNOSIS — F41.1 GAD (GENERALIZED ANXIETY DISORDER): ICD-10-CM

## 2025-04-07 DIAGNOSIS — F33.0 MILD EPISODE OF RECURRENT MAJOR DEPRESSIVE DISORDER (CMS-HCC): ICD-10-CM

## 2025-04-07 DIAGNOSIS — F43.21 GRIEF: ICD-10-CM

## 2025-04-07 PROCEDURE — 90837 PSYTX W PT 60 MINUTES: CPT | Performed by: PSYCHOLOGIST

## 2025-04-07 NOTE — PROGRESS NOTES
Start time: 10:07am  End time: 11:01am    The patient was informed of the current need to conduct treatment via telephone or telehealth due to Covid-19 pandemic. Patient consented to the use of the platform that may not be HIPAA compliant. I have confirmed the patient's identity via the following (minimum of three) acceptable identifiers as per  Policy PH-9:   1. Last 4 of social:   2. :   3. Address:    Telephone/Televideo Informed Consent for Psychotherapy was reviewed with the patient as follows:  There are potential benefits and risks of the use of telephone or video-conferencing that differ from in-person sessions. Specifically, the telephone or televideo system we are using may not be HIPAA compliant and may present limits to patient confidentiality. Confidentiality still applies for telepsychology services, and nobody will record the session without your permission. You agree to use the telephone or video-conferencing platform selected for our virtual sessions, and I will explain how to use it.  1)             You need to use a webcam or smartphone during the session.  2)             It is important that you be in a quiet, private space that is free of distractions (including cell phone or other devices) during the session.  3)             It is important to use a secure internet connection rather than public/free Wi-Fi.  4)             It is important to be on time. If you need to cancel or change your tele-appointment, you must notify the psychologist in advance by phone or email.  5)             We need a back-up plan (e.g., phone number where you can be reached) to restart the session or to reschedule it, in the event of technical problems.  6)             We need a safety plan that includes at least one emergency contact and the closest emergency room to your location, in the event of a crisis situation.  7)             If you are not an adult, we need the permission of your parent or legal guardian  "(and their contact information) for you to participate in telepsychology sessions.  Understanding and verbal agreement was attested to by the patient.    Magaly reported that she would like her notes to remain blocked at this time.     Reason for care: Anxiety; Depression  Method of therapy: Supportive; CBT  Therapy summary: Magaly reported that she is watching her sister's children this morning. She stated that overall she has been feeling \"better\" but still noticing crying spells. She stated that she has been crying while going through her father's belongings.     She has been exploring ways to spend time with her mother unrelated to going through her father's belongings. She discussed feeling stressed by her mother at times and how their relationship is different than the relationship she had with her dad. She explored ways to have positive interactions with her mother and providing empathy as well as setting healthy boundaries. She processed recent difficulty relating to her grieving and feeling sad about her father's suffering.     She stated that her anxiety has been lower since our last appointment. She detailed some anxiety prior to falling asleep. She is wanting to get into a better nighttime routine. She stated that she has been having nightmares and needing to be woken in the night. She feels this may be related to medication changes. I provided education on sleep hygiene and cognitive shuffling technique.     She has continued to writing down and thinking about good things that have happened as well as identifying affirmations.    Action plan: Creating a plan for Saturdays as they tend to bring up more grief; continue writing down one good thing per day and using affirmations; use anxiety management tools    She denies SI/HI/AVH. We will follow-up in two weeks.    Identified goals/objectives: Processing grief; engaging in more structured relaxation; Continue engaging in self-care activities; identify " workable schedule and routine (continue increasing activity); Continue exercising; reframe negative thinking with writing  Response to therapy: Engaged  Treatment plan and process: Continue with above stated tx goals; Psycho-education on anxiety management strategies; Goal setting for health and wellness; Schedule and Routine exploration; Processing grief; continued education on grief and loss

## 2025-04-11 ENCOUNTER — APPOINTMENT (OUTPATIENT)
Dept: PRIMARY CARE | Facility: CLINIC | Age: 45
End: 2025-04-11
Payer: COMMERCIAL

## 2025-04-17 ENCOUNTER — OFFICE VISIT (OUTPATIENT)
Dept: PRIMARY CARE | Facility: CLINIC | Age: 45
End: 2025-04-17
Payer: COMMERCIAL

## 2025-04-17 VITALS
SYSTOLIC BLOOD PRESSURE: 108 MMHG | WEIGHT: 247.8 LBS | BODY MASS INDEX: 37.68 KG/M2 | TEMPERATURE: 97.5 F | DIASTOLIC BLOOD PRESSURE: 70 MMHG | HEART RATE: 73 BPM | OXYGEN SATURATION: 98 %

## 2025-04-17 DIAGNOSIS — F41.1 GAD (GENERALIZED ANXIETY DISORDER): ICD-10-CM

## 2025-04-17 DIAGNOSIS — R31.29 OTHER MICROSCOPIC HEMATURIA: ICD-10-CM

## 2025-04-17 DIAGNOSIS — F32.9 MAJOR DEPRESSIVE EPISODE: ICD-10-CM

## 2025-04-17 DIAGNOSIS — R30.0 DYSURIA: Primary | ICD-10-CM

## 2025-04-17 LAB
POC APPEARANCE, URINE: CLEAR
POC BILIRUBIN, URINE: NEGATIVE
POC BLOOD, URINE: ABNORMAL
POC COLOR, URINE: YELLOW
POC GLUCOSE, URINE: NEGATIVE MG/DL
POC KETONES, URINE: NEGATIVE MG/DL
POC LEUKOCYTES, URINE: NEGATIVE
POC NITRITE,URINE: NEGATIVE
POC PH, URINE: 6 PH
POC PROTEIN, URINE: NEGATIVE MG/DL
POC SPECIFIC GRAVITY, URINE: 1.01
POC UROBILINOGEN, URINE: 0.2 EU/DL

## 2025-04-17 PROCEDURE — G8433 SCR FOR DEP NOT CPT DOC RSN: HCPCS

## 2025-04-17 PROCEDURE — 99214 OFFICE O/P EST MOD 30 MIN: CPT

## 2025-04-17 PROCEDURE — 81003 URINALYSIS AUTO W/O SCOPE: CPT

## 2025-04-17 PROCEDURE — 1036F TOBACCO NON-USER: CPT

## 2025-04-17 PROCEDURE — 3078F DIAST BP <80 MM HG: CPT

## 2025-04-17 PROCEDURE — 3074F SYST BP LT 130 MM HG: CPT

## 2025-04-17 RX ORDER — SULFAMETHOXAZOLE AND TRIMETHOPRIM 800; 160 MG/1; MG/1
1 TABLET ORAL 2 TIMES DAILY
Qty: 6 TABLET | Refills: 0 | Status: SHIPPED | OUTPATIENT
Start: 2025-04-17 | End: 2025-04-20

## 2025-04-17 RX ORDER — BUPROPION HYDROCHLORIDE 150 MG/1
150 TABLET ORAL DAILY
Qty: 90 TABLET | Refills: 1 | Status: SHIPPED | OUTPATIENT
Start: 2025-04-17 | End: 2025-10-14

## 2025-04-17 NOTE — PROGRESS NOTES
Subjective   Patient ID: Magaly Louie is a 45 y.o. female who presents for Follow-up (Follow up for Depression Medication).    HPI   Patient here today for depression follow up  Following starting medication was initially having bad dreams/nightmares.  Will still have some increasing anxiety particularly before bed  Overall feels mood to be much improved.    Was seen last week at urgent care for UTI symptoms  Treated with antibiotic: nitrofurantoin   Feels like dribbling urine. Has pelvic pain as well  Denies fever, has low back back but denies localization to CVA. No n/v  Review of Systems    Objective   /70 (BP Location: Left arm, Patient Position: Sitting)   Pulse 73   Temp 36.4 °C (97.5 °F)   Wt 112 kg (247 lb 12.8 oz)   SpO2 98%   BMI 37.68 kg/m²     Physical Exam  Constitutional:       General: She is not in acute distress.     Appearance: Normal appearance. She is not ill-appearing.   Eyes:      General: No scleral icterus.  Cardiovascular:      Rate and Rhythm: Normal rate and regular rhythm.      Heart sounds: No murmur heard.     No friction rub. No gallop.   Pulmonary:      Effort: Pulmonary effort is normal. No respiratory distress.      Breath sounds: Normal breath sounds. No wheezing, rhonchi or rales.   Musculoskeletal:      Right lower leg: No edema.      Left lower leg: No edema.   Neurological:      General: No focal deficit present.      Mental Status: She is alert and oriented to person, place, and time.   Psychiatric:         Mood and Affect: Mood normal.         Behavior: Behavior normal.         Assessment/Plan   Problem List Items Addressed This Visit           ICD-10-CM    DIANNE (generalized anxiety disorder) F41.1    Relevant Medications    vortioxetine (Trintellix) 10 mg tablet tablet    Major depressive episode F32.9    Relevant Medications    vortioxetine (Trintellix) 10 mg tablet tablet    buPROPion XL (Wellbutrin XL) 150 mg 24 hr tablet     Other Visit Diagnoses          Codes      Dysuria    -  Primary R30.0    Relevant Medications    sulfamethoxazole-trimethoprim (Bactrim DS) 800-160 mg tablet    Other Relevant Orders    POCT UA Automated manually resulted (Completed)    Urine Culture      Other microscopic hematuria     R31.29    Relevant Medications    sulfamethoxazole-trimethoprim (Bactrim DS) 800-160 mg tablet    Other Relevant Orders    Urine Culture        UA ordered today given persistent urinary symptoms.  Notable for microscopic hematuria.  Will send for culture however treat empirically.  Will discontinue antibiotics if failed bacterial growth.  Patient depressive symptoms are much improved on Trintellix/bupropion.  Is tolerating well at lower dose, given potential CYP inhibition caution higher doses of this medication.  Recommend 6-month follow-up for medication management or sooner for any acute concerns      Gilson Scott,

## 2025-04-19 LAB — BACTERIA UR CULT: NORMAL

## 2025-04-21 ENCOUNTER — APPOINTMENT (OUTPATIENT)
Dept: BEHAVIORAL HEALTH | Facility: CLINIC | Age: 45
End: 2025-04-21
Payer: COMMERCIAL

## 2025-04-21 DIAGNOSIS — R39.9 UTI SYMPTOMS: Primary | ICD-10-CM

## 2025-04-21 DIAGNOSIS — F41.1 GAD (GENERALIZED ANXIETY DISORDER): ICD-10-CM

## 2025-04-21 DIAGNOSIS — F33.0 MILD EPISODE OF RECURRENT MAJOR DEPRESSIVE DISORDER (CMS-HCC): ICD-10-CM

## 2025-04-21 DIAGNOSIS — F43.21 GRIEF: ICD-10-CM

## 2025-04-21 PROCEDURE — 90834 PSYTX W PT 45 MINUTES: CPT | Performed by: PSYCHOLOGIST

## 2025-04-21 RX ORDER — NITROFURANTOIN 25; 75 MG/1; MG/1
100 CAPSULE ORAL 2 TIMES DAILY
Qty: 14 CAPSULE | Refills: 0 | Status: SHIPPED | OUTPATIENT
Start: 2025-04-21 | End: 2025-04-28

## 2025-04-21 NOTE — PROGRESS NOTES
Start time: 10:06am  End time: 10:55am    The patient was informed of the current need to conduct treatment via telephone or telehealth due to Covid-19 pandemic. Patient consented to the use of the platform that may not be HIPAA compliant. I have confirmed the patient's identity via the following (minimum of three) acceptable identifiers as per  Policy PH-9:   1. Last 4 of social:   2. :   3. Address:    Telephone/Televideo Informed Consent for Psychotherapy was reviewed with the patient as follows:  There are potential benefits and risks of the use of telephone or video-conferencing that differ from in-person sessions. Specifically, the telephone or televideo system we are using may not be HIPAA compliant and may present limits to patient confidentiality. Confidentiality still applies for telepsychology services, and nobody will record the session without your permission. You agree to use the telephone or video-conferencing platform selected for our virtual sessions, and I will explain how to use it.  1)             You need to use a webcam or smartphone during the session.  2)             It is important that you be in a quiet, private space that is free of distractions (including cell phone or other devices) during the session.  3)             It is important to use a secure internet connection rather than public/free Wi-Fi.  4)             It is important to be on time. If you need to cancel or change your tele-appointment, you must notify the psychologist in advance by phone or email.  5)             We need a back-up plan (e.g., phone number where you can be reached) to restart the session or to reschedule it, in the event of technical problems.  6)             We need a safety plan that includes at least one emergency contact and the closest emergency room to your location, in the event of a crisis situation.  7)             If you are not an adult, we need the permission of your parent or legal guardian  "(and their contact information) for you to participate in telepsychology sessions.  Understanding and verbal agreement was attested to by the patient.    Magaly reported that she would like her notes to remain blocked at this time.     Reason for care: Anxiety; Depression  Method of therapy: Supportive; CBT  Therapy summary: Magaly reported that she hosted [employer] yesterday and both hr and her 's families came over. She stated that it was overwhelming but that she liked having everyone over. She described feeling guilty for not spending time with everyone.     She stated that she hasn't been feeling well physically. She discussed recent GI upset and bladder issues. She started an antibiotic and found that it heightened her anxiety significantly. She stopped her antibiotic and is planning to talk with her primary care physician.     She set goals related to her health and weight. She reported that she would like to be more active. She stated that she would like to a set a goal to walk around the block at least three times per week while tracking. Additionally, she stated that she would like to start tracking her food intake. She has an amelia that can help with this. She identified barriers to these goals such as motivation.     She described having a \"good conversation\" with her mother about her father and what happened with his health and passing. She stated that she also connected with her sister recently, which was helpful. She explored building her support.     Action plan: walking 3x per week; meal tracking; using support from family and friends; continue identifying affirmations for self-esteem enhancing; follow-up with PCP related to physical health concerns     She denies SI/HI/AVH. We will follow-up in two weeks.    Identified goals/objectives: Processing grief; engaging in more structured relaxation; Continue engaging in self-care activities; identify workable schedule and routine (continue increasing " activity); Continue exercising; reframe negative thinking with writing  Response to therapy: Engaged  Treatment plan and process: Continue with above stated tx goals; Psycho-education on anxiety management strategies; Goal setting for health and wellness; Schedule and Routine exploration; Processing grief; continued education on grief and loss

## 2025-04-24 ENCOUNTER — APPOINTMENT (OUTPATIENT)
Dept: PRIMARY CARE | Facility: CLINIC | Age: 45
End: 2025-04-24
Payer: COMMERCIAL

## 2025-04-24 ENCOUNTER — OFFICE VISIT (OUTPATIENT)
Facility: CLINIC | Age: 45
End: 2025-04-24
Payer: COMMERCIAL

## 2025-04-24 VITALS
BODY MASS INDEX: 37.28 KG/M2 | HEIGHT: 68 IN | WEIGHT: 246 LBS | DIASTOLIC BLOOD PRESSURE: 84 MMHG | SYSTOLIC BLOOD PRESSURE: 124 MMHG

## 2025-04-24 DIAGNOSIS — N94.89 VAGINAL BURNING: ICD-10-CM

## 2025-04-24 PROCEDURE — 1036F TOBACCO NON-USER: CPT

## 2025-04-24 PROCEDURE — 3008F BODY MASS INDEX DOCD: CPT

## 2025-04-24 PROCEDURE — 99213 OFFICE O/P EST LOW 20 MIN: CPT

## 2025-04-24 PROCEDURE — 3074F SYST BP LT 130 MM HG: CPT

## 2025-04-24 PROCEDURE — 3079F DIAST BP 80-89 MM HG: CPT

## 2025-04-24 ASSESSMENT — ENCOUNTER SYMPTOMS
OCCASIONAL FEELINGS OF UNSTEADINESS: 0
LOSS OF SENSATION IN FEET: 0
DEPRESSION: 0

## 2025-04-24 NOTE — PROGRESS NOTES
"Assessment/Plan     Vaginal burning with urination  - pelvic exam wnl today, so s/s of infection  - vaginitis swab collected; will await results prior to initiating treatment  - repeat urine culture ordered  - GC/CT/Trich testing declined, pt is monogamous  - if positive for BV or Yeast, pt prefers pills  - advised patient to cleanse external genital area with Dove sensitive soap only, avoid commercial feminine hygiene products and douching, use condoms to prevent semen from altering vaginal pH, and to wear cotton underwear and loose fitted clothing  - urology referral placed; pt encouraged to schedule in event her tests all result negative and her symptoms persist. Potential interstitial cystitis.     RTO as needed    Arlene Wolfe, [place]-CNM    Subjective     Magaly Louie is a 45 y.o. female presenting for vaginal burning after urination x 2 weeks. She describes it as a pain \"deep inside\" her vagina which radiates to her hips bilaterally.   She had a telehealth appt 4/11 and was treated for UTI based upon her symptoms with Macrobid.  After finishing the medication she started having vaginal burning again, for which she saw her PCP. He prescribed Bactrim that time following an IO UA with small amounts of blood, however, she discontinued this after 2 days once the urine culture resulted negative.     Associated symptoms today include pelvic aching.   Finishing her menses right now - states it was painful to wear a tampon.   Denies associated symptoms of vaginal itching, discharge, nor odor.     Sexually active, monogomous with her .     Denies known triggering factors - no change in soaps nor detergents. She and her  do not use condoms.       Objective     /84   Ht 1.727 m (5' 8\")   Wt 112 kg (246 lb)   LMP 04/18/2025   BMI 37.40 kg/m²     Physical Exam  Vitals reviewed.   Constitutional:       General: She is not in acute distress.     Appearance: Normal appearance.   HENT:      Head: " Normocephalic and atraumatic.   Pulmonary:      Effort: Pulmonary effort is normal.   Genitourinary:     General: Normal vulva.      Exam position: Lithotomy position.      Pubic Area: No rash.       Labia:         Right: No rash or lesion.         Left: No rash or lesion.       Urethra: No prolapse, urethral pain or urethral swelling.      Vagina: Bleeding present.      Cervix: No cervical motion tenderness, discharge or lesion.      Uterus: Not enlarged and not tender.       Adnexa:         Right: No mass or tenderness.          Left: No mass or tenderness.        Comments: Small amount of brown blood c/w pt finishing menses  Musculoskeletal:         General: Normal range of motion.      Cervical back: Normal range of motion.   Skin:     General: Skin is warm and dry.   Neurological:      Mental Status: She is alert and oriented to person, place, and time.   Psychiatric:         Mood and Affect: Mood normal.         Behavior: Behavior normal.         Thought Content: Thought content normal.         Judgment: Judgment normal.

## 2025-04-25 LAB — BV SCORE VAG QL: NORMAL

## 2025-04-26 LAB — BACTERIA UR CULT: NORMAL

## 2025-05-05 ENCOUNTER — APPOINTMENT (OUTPATIENT)
Dept: BEHAVIORAL HEALTH | Facility: CLINIC | Age: 45
End: 2025-05-05
Payer: COMMERCIAL

## 2025-05-05 DIAGNOSIS — F33.0 MILD EPISODE OF RECURRENT MAJOR DEPRESSIVE DISORDER (CMS-HCC): ICD-10-CM

## 2025-05-05 DIAGNOSIS — F41.1 GAD (GENERALIZED ANXIETY DISORDER): ICD-10-CM

## 2025-05-05 PROCEDURE — 90834 PSYTX W PT 45 MINUTES: CPT | Performed by: PSYCHOLOGIST

## 2025-05-05 NOTE — PROGRESS NOTES
Start time: 10:08am  End time: 10:53am    The patient was informed of the current need to conduct treatment via telephone or telehealth due to Covid-19 pandemic. Patient consented to the use of the platform that may not be HIPAA compliant. I have confirmed the patient's identity via the following (minimum of three) acceptable identifiers as per  Policy PH-9:   1. Last 4 of social:   2. :   3. Address:    Telephone/Televideo Informed Consent for Psychotherapy was reviewed with the patient as follows:  There are potential benefits and risks of the use of telephone or video-conferencing that differ from in-person sessions. Specifically, the telephone or televideo system we are using may not be HIPAA compliant and may present limits to patient confidentiality. Confidentiality still applies for telepsychology services, and nobody will record the session without your permission. You agree to use the telephone or video-conferencing platform selected for our virtual sessions, and I will explain how to use it.  1)             You need to use a webcam or smartphone during the session.  2)             It is important that you be in a quiet, private space that is free of distractions (including cell phone or other devices) during the session.  3)             It is important to use a secure internet connection rather than public/free Wi-Fi.  4)             It is important to be on time. If you need to cancel or change your tele-appointment, you must notify the psychologist in advance by phone or email.  5)             We need a back-up plan (e.g., phone number where you can be reached) to restart the session or to reschedule it, in the event of technical problems.  6)             We need a safety plan that includes at least one emergency contact and the closest emergency room to your location, in the event of a crisis situation.  7)             If you are not an adult, we need the permission of your parent or legal guardian  (and their contact information) for you to participate in telepsychology sessions.  Understanding and verbal agreement was attested to by the patient.    Magaly reported that she would like her notes to remain blocked at this time.     Reason for care: Anxiety; Depression  Method of therapy: Supportive; CBT  Therapy summary: Magaly reported that she has been much more active and walking. She has also signed up to work with a  and explored how this change feels somewhat overwhelming. She described some anxiety related to feeling busy.     She discussed work related stressors and coping with challenging dynamics at work.     She continued to process body image challenges and comparing herself to co-workers. She discussed wanting to take a different path than others and not take medications for weight loss. She explored how a significant part of her self-esteem relates to her outward appearance. She described positive traits that help to increase her self-esteem. She described herself as empathic, kind and generous. She was able to identify things that helped to boost her self-esteem and we explored how to use these characteristics to help keep her motivated on her goals. We will continue to work toward increasing positive self-talk and reframing negative thoughts that impact self-esteem.     Action plan: walking 3x per week; meal tracking; using support from family and friends; continue identifying affirmations for self-esteem enhancing; follow-up with PCP related to physical health concerns     She denies SI/HI/AVH. We will follow-up in two weeks.    Identified goals/objectives: Processing grief; engaging in more structured relaxation; Continue engaging in self-care activities; identify workable schedule and routine (continue increasing activity); Continue exercising; reframe negative thinking with writing  Response to therapy: Engaged  Treatment plan and process: Continue with above stated tx  goals; Psycho-education on anxiety management strategies; Goal setting for health and wellness; Schedule and Routine exploration; Processing grief; continued education on grief and loss

## 2025-05-19 ENCOUNTER — APPOINTMENT (OUTPATIENT)
Dept: BEHAVIORAL HEALTH | Facility: CLINIC | Age: 45
End: 2025-05-19
Payer: COMMERCIAL

## 2025-05-19 DIAGNOSIS — F41.1 GAD (GENERALIZED ANXIETY DISORDER): ICD-10-CM

## 2025-05-19 DIAGNOSIS — F43.21 GRIEF: ICD-10-CM

## 2025-05-19 DIAGNOSIS — F33.0 MILD EPISODE OF RECURRENT MAJOR DEPRESSIVE DISORDER: ICD-10-CM

## 2025-05-19 PROCEDURE — 90837 PSYTX W PT 60 MINUTES: CPT | Performed by: PSYCHOLOGIST

## 2025-06-02 ENCOUNTER — APPOINTMENT (OUTPATIENT)
Dept: BEHAVIORAL HEALTH | Facility: CLINIC | Age: 45
End: 2025-06-02
Payer: COMMERCIAL

## 2025-06-02 DIAGNOSIS — F41.1 GAD (GENERALIZED ANXIETY DISORDER): ICD-10-CM

## 2025-06-02 DIAGNOSIS — F43.21 GRIEF: ICD-10-CM

## 2025-06-02 DIAGNOSIS — F33.0 MILD EPISODE OF RECURRENT MAJOR DEPRESSIVE DISORDER: ICD-10-CM

## 2025-06-02 PROCEDURE — 90834 PSYTX W PT 45 MINUTES: CPT | Performed by: PSYCHOLOGIST

## 2025-06-06 DIAGNOSIS — I10 PRIMARY HYPERTENSION: ICD-10-CM

## 2025-06-06 RX ORDER — METOPROLOL SUCCINATE 25 MG/1
25 TABLET, EXTENDED RELEASE ORAL DAILY
Qty: 90 TABLET | Refills: 0 | OUTPATIENT
Start: 2025-06-06

## 2025-06-10 DIAGNOSIS — I10 PRIMARY HYPERTENSION: ICD-10-CM

## 2025-06-10 RX ORDER — METOPROLOL SUCCINATE 25 MG/1
25 TABLET, EXTENDED RELEASE ORAL DAILY
Qty: 90 TABLET | Refills: 1 | Status: SHIPPED | OUTPATIENT
Start: 2025-06-10

## 2025-06-10 NOTE — TELEPHONE ENCOUNTER
REFILL  MEDICATION:     Metoprolol Succinate XL 25 MG; Take 1 tablet daily.     PHARM: [facility]   PHARM NUMBER: [phone]    LR: 3/10/25       90 tablets with 0 refills   LV: 4/17/25  NV: 12/16/25

## 2025-06-16 ENCOUNTER — APPOINTMENT (OUTPATIENT)
Dept: BEHAVIORAL HEALTH | Facility: CLINIC | Age: 45
End: 2025-06-16
Payer: COMMERCIAL

## 2025-06-16 DIAGNOSIS — F41.1 GAD (GENERALIZED ANXIETY DISORDER): ICD-10-CM

## 2025-06-16 DIAGNOSIS — F33.0 MILD EPISODE OF RECURRENT MAJOR DEPRESSIVE DISORDER: ICD-10-CM

## 2025-06-16 DIAGNOSIS — F43.21 GRIEF: ICD-10-CM

## 2025-06-16 PROCEDURE — 90837 PSYTX W PT 60 MINUTES: CPT | Performed by: PSYCHOLOGIST

## 2025-06-16 NOTE — PROGRESS NOTES
Start time: 10:03am  End time: 10:57am    The patient was informed of the current need to conduct treatment via telephone or telehealth due to Covid-19 pandemic. Patient consented to the use of the platform that may not be HIPAA compliant. I have confirmed the patient's identity via the following (minimum of three) acceptable identifiers as per  Policy PH-9:   1. Last 4 of social:   2. :   3. Address:    Telephone/Televideo Informed Consent for Psychotherapy was reviewed with the patient as follows:  There are potential benefits and risks of the use of telephone or video-conferencing that differ from in-person sessions. Specifically, the telephone or televideo system we are using may not be HIPAA compliant and may present limits to patient confidentiality. Confidentiality still applies for telepsychology services, and nobody will record the session without your permission. You agree to use the telephone or video-conferencing platform selected for our virtual sessions, and I will explain how to use it.  1)             You need to use a webcam or smartphone during the session.  2)             It is important that you be in a quiet, private space that is free of distractions (including cell phone or other devices) during the session.  3)             It is important to use a secure internet connection rather than public/free Wi-Fi.  4)             It is important to be on time. If you need to cancel or change your tele-appointment, you must notify the psychologist in advance by phone or email.  5)             We need a back-up plan (e.g., phone number where you can be reached) to restart the session or to reschedule it, in the event of technical problems.  6)             We need a safety plan that includes at least one emergency contact and the closest emergency room to your location, in the event of a crisis situation.  7)             If you are not an adult, we need the permission of your parent or legal guardian  "(and their contact information) for you to participate in telepsychology sessions.  Understanding and verbal agreement was attested to by the patient.    Magaly reported that she would like her notes to remain blocked at this time.     Reason for care: Anxiety; Depression  Method of therapy: Supportive; CBT  Therapy summary: Magaly processed her first Father's Day weekend without her father. She stated that she felt very irritable leading up to this and attempted to distract herself. She also attended a butterfly release at her father's hospice facility. She continued to process grief.     She has attempted to spend time with her mother but processed challenges. She explored what their relationship has looked like over time.      She is continuing to work with her  and track her progress. She is noticing positive changes in her body (I.e., feeling stronger, not experiencing as much pain with movement). She described feeling happy about this progress.     Action plan: walking 3x per week (has not been walking but is continuing to meet with her ), she explored ways to increase her walking; meal tracking; continue identifying affirmations and using the \"good things exercise\" for increasing self-esteem and mood    She denies SI/HI/AVH. We will follow-up in two weeks.    Identified goals/objectives: Processing grief; engaging in more structured relaxation day to day to help lower nervous system response; Continue engaging in self-care activities;  Continue exercising; reframe negative thinking with writing  Response to therapy: Engaged  Treatment plan and process: Continue with above stated tx goals; Psycho-education on anxiety management strategies; Goal setting for health and wellness; Schedule and Routine exploration; Processing grief; continued education on grief and loss  "

## 2025-06-30 ENCOUNTER — APPOINTMENT (OUTPATIENT)
Dept: BEHAVIORAL HEALTH | Facility: CLINIC | Age: 45
End: 2025-06-30
Payer: COMMERCIAL

## 2025-06-30 DIAGNOSIS — F41.1 GAD (GENERALIZED ANXIETY DISORDER): ICD-10-CM

## 2025-06-30 DIAGNOSIS — F33.0 MILD EPISODE OF RECURRENT MAJOR DEPRESSIVE DISORDER: ICD-10-CM

## 2025-06-30 DIAGNOSIS — F43.21 GRIEF: ICD-10-CM

## 2025-06-30 PROCEDURE — 90837 PSYTX W PT 60 MINUTES: CPT | Performed by: PSYCHOLOGIST

## 2025-06-30 NOTE — PROGRESS NOTES
Start time: 9:03am  End time: 9:58am    The patient was informed of the current need to conduct treatment via telephone or telehealth due to Covid-19 pandemic. Patient consented to the use of the platform that may not be HIPAA compliant. I have confirmed the patient's identity via the following (minimum of three) acceptable identifiers as per  Policy PH-9:   1. Last 4 of social:   2. :   3. Address:    Telephone/Televideo Informed Consent for Psychotherapy was reviewed with the patient as follows:  There are potential benefits and risks of the use of telephone or video-conferencing that differ from in-person sessions. Specifically, the telephone or televideo system we are using may not be HIPAA compliant and may present limits to patient confidentiality. Confidentiality still applies for telepsychology services, and nobody will record the session without your permission. You agree to use the telephone or video-conferencing platform selected for our virtual sessions, and I will explain how to use it.  1)             You need to use a webcam or smartphone during the session.  2)             It is important that you be in a quiet, private space that is free of distractions (including cell phone or other devices) during the session.  3)             It is important to use a secure internet connection rather than public/free Wi-Fi.  4)             It is important to be on time. If you need to cancel or change your tele-appointment, you must notify the psychologist in advance by phone or email.  5)             We need a back-up plan (e.g., phone number where you can be reached) to restart the session or to reschedule it, in the event of technical problems.  6)             We need a safety plan that includes at least one emergency contact and the closest emergency room to your location, in the event of a crisis situation.  7)             If you are not an adult, we need the permission of your parent or legal guardian  "(and their contact information) for you to participate in telepsychology sessions.  Understanding and verbal agreement was attested to by the patient.    Magaly reported that she would like her notes to remain blocked at this time.     Reason for care: Anxiety; Depression  Method of therapy: Supportive; CBT  Therapy summary: Magaly reported that she just returned home from vacation last night and discussed how vacation went. She processed dynamics in the house. She shared certain family members' health have become concerning.     She reported that she has tried to limit her social media use and feels she has been sleeping better. She discussed work related stressors and returning to work.     She has been attempting to set boundaries with her mother.     She stated that her anxiety and mood have been \"overall pretty good.\" She described feeling some anxiety while driving and noticed that it was related to driving over bridges and through tunnels.    She attempted to maintain her routine during vacation. She stated that she has continued to struggle with self-esteem related to weight. She discussed difficult interactions regarding the topics of exercise and weight loss and identified discomfort with discussing these topics with others. She explored ways to respond and attempted to identify why these topics bring up discomfort for her.     Action plan: getting back into routine following vacation; continue boundary setting with family; identifying good things at work    She denies SI/HI/AVH. We will follow-up in two weeks.    Identified goals/objectives: Processing grief and upcoming anniversary of father's passing; engaging in more structured relaxation day to day to help lower nervous system response; Continue engaging in self-care activities;  Continue exercising; reframe negative thinking with writing  Response to therapy: Engaged  Treatment plan and process: Continue with above stated tx goals; Psycho-education " on anxiety management strategies; Goal setting for health and wellness; Schedule and Routine exploration; Processing grief; continued education on grief and loss

## 2025-07-03 ENCOUNTER — APPOINTMENT (OUTPATIENT)
Dept: PRIMARY CARE | Facility: CLINIC | Age: 45
End: 2025-07-03
Payer: COMMERCIAL

## 2025-07-03 VITALS
BODY MASS INDEX: 37.56 KG/M2 | TEMPERATURE: 97 F | WEIGHT: 247 LBS | DIASTOLIC BLOOD PRESSURE: 74 MMHG | SYSTOLIC BLOOD PRESSURE: 126 MMHG

## 2025-07-03 DIAGNOSIS — E66.01 MORBID OBESITY (MULTI): ICD-10-CM

## 2025-07-03 DIAGNOSIS — I10 PRIMARY HYPERTENSION: ICD-10-CM

## 2025-07-03 DIAGNOSIS — N92.6 MISSED PERIOD: Primary | ICD-10-CM

## 2025-07-03 LAB — PREGNANCY TEST URINE, POC: NEGATIVE

## 2025-07-03 PROCEDURE — 99214 OFFICE O/P EST MOD 30 MIN: CPT | Performed by: FAMILY MEDICINE

## 2025-07-03 PROCEDURE — 1036F TOBACCO NON-USER: CPT | Performed by: FAMILY MEDICINE

## 2025-07-03 PROCEDURE — 3078F DIAST BP <80 MM HG: CPT | Performed by: FAMILY MEDICINE

## 2025-07-03 PROCEDURE — 81025 URINE PREGNANCY TEST: CPT | Performed by: FAMILY MEDICINE

## 2025-07-03 PROCEDURE — 3074F SYST BP LT 130 MM HG: CPT | Performed by: FAMILY MEDICINE

## 2025-07-03 RX ORDER — METOPROLOL SUCCINATE 25 MG/1
25 TABLET, EXTENDED RELEASE ORAL DAILY
Qty: 90 TABLET | Refills: 1 | Status: SHIPPED | OUTPATIENT
Start: 2025-07-03

## 2025-07-03 ASSESSMENT — PATIENT HEALTH QUESTIONNAIRE - PHQ9
SUM OF ALL RESPONSES TO PHQ9 QUESTIONS 1 AND 2: 0
1. LITTLE INTEREST OR PLEASURE IN DOING THINGS: NOT AT ALL
2. FEELING DOWN, DEPRESSED OR HOPELESS: NOT AT ALL

## 2025-07-03 NOTE — PROGRESS NOTES
Chief complaint:   Chief Complaint   Patient presents with    Blood pressure follow up       HPI:  Magaly SALAMANCA Liban is a 45 y.o. female who presents for evaluation of her blood pressure. She is not checking at home.     Additionally she is late a few days for her period.     She is desiring weight loss for herself and is trying to do this with healthy diet and exercise habits but is not having success.     Physical exam:  /74 (BP Location: Right arm, Patient Position: Sitting)   Temp 36.1 °C (97 °F)   Wt 112 kg (247 lb)   BMI 37.56 kg/m²   General: NAD, well appearing female  Heart: RRR, no mumur appreciated  Lungs: CTAB, no wheezes, rales, rhonchi  Abdomen: soft, non tender, normoactive BS, no organomegaly  Extremities: No LE edema    Assessment/Plan   Problem List Items Addressed This Visit       Hypertension    Relevant Medications    metoprolol succinate XL (Toprol-XL) 25 mg 24 hr tablet    Morbid obesity (Multi)     Other Visit Diagnoses         Missed period    -  Primary    Relevant Orders    POCT Pregnancy, Urine manually resulted (Completed)      BMI 37.0-37.9, adult            Refilled BP medication as above, at goal for her HTN  For her missed period, Pregnancy testing negative in office today  Continue working on weight loss through healthy diet and exercise for her morbid obesity (BMI 37). Discussed weight loss medications  Follow up 6 mo, sooner as needed    Magaly Rivera, DO

## 2025-07-17 ENCOUNTER — APPOINTMENT (OUTPATIENT)
Dept: BEHAVIORAL HEALTH | Facility: CLINIC | Age: 45
End: 2025-07-17
Payer: COMMERCIAL

## 2025-07-17 DIAGNOSIS — F43.21 GRIEF: ICD-10-CM

## 2025-07-17 DIAGNOSIS — F41.1 GAD (GENERALIZED ANXIETY DISORDER): ICD-10-CM

## 2025-07-17 DIAGNOSIS — F33.0 MILD EPISODE OF RECURRENT MAJOR DEPRESSIVE DISORDER: ICD-10-CM

## 2025-07-17 PROCEDURE — 90837 PSYTX W PT 60 MINUTES: CPT | Performed by: PSYCHOLOGIST

## 2025-07-17 NOTE — PROGRESS NOTES
Start time: 9:06am  End time: 9:59am    The patient was informed of the current need to conduct treatment via telephone or telehealth due to Covid-19 pandemic. Patient consented to the use of the platform that may not be HIPAA compliant. I have confirmed the patient's identity via the following (minimum of three) acceptable identifiers as per  Policy PH-9:   1. Last 4 of social:   2. :   3. Address:    Telephone/Televideo Informed Consent for Psychotherapy was reviewed with the patient as follows:  There are potential benefits and risks of the use of telephone or video-conferencing that differ from in-person sessions. Specifically, the telephone or televideo system we are using may not be HIPAA compliant and may present limits to patient confidentiality. Confidentiality still applies for telepsychology services, and nobody will record the session without your permission. You agree to use the telephone or video-conferencing platform selected for our virtual sessions, and I will explain how to use it.  1)             You need to use a webcam or smartphone during the session.  2)             It is important that you be in a quiet, private space that is free of distractions (including cell phone or other devices) during the session.  3)             It is important to use a secure internet connection rather than public/free Wi-Fi.  4)             It is important to be on time. If you need to cancel or change your tele-appointment, you must notify the psychologist in advance by phone or email.  5)             We need a back-up plan (e.g., phone number where you can be reached) to restart the session or to reschedule it, in the event of technical problems.  6)             We need a safety plan that includes at least one emergency contact and the closest emergency room to your location, in the event of a crisis situation.  7)             If you are not an adult, we need the permission of your parent or legal guardian  (and their contact information) for you to participate in telepsychology sessions.  Understanding and verbal agreement was attested to by the patient.    Magaly reported that she would like her notes to remain blocked at this time.     Call was disconnected and session completed over telephone.     Reason for care: Anxiety; Depression  Method of therapy: Supportive; CBT  Therapy summary: Magaly reported that the anniversary of her father's death recently passed. She detailed how she spent her day and how she coped with the emotions she was feeling. She explored the ways in which she attempted to connect with others and her father.     She processed having a cyst on her face removed.    She described anxiety related to an upcoming family party and potentially seeing her ex-partner. She discussed how she wants to approach this gathering and shared details from their relationship.     She is planning to stay with her mom when her mother has knee surgery. She processed planning for this and boundaries she is preparing to set.     Overall, she noted sadness from grief and anxiety related to upcoming events. She feels she is managing well and utilizing skills as well as support from her .     Action plan: Continuing routine and behavioral activation; continue boundary setting with family; processing and navigating grief    She denies SI/HI/AVH. We will follow-up in two weeks.    Identified goals/objectives: Processing grief; engaging in more structured relaxation day to day to help lower nervous system response; Continue engaging in self-care activities;  Continue exercising; reframe negative thinking with writing  Response to therapy: Engaged  Treatment plan and process: Continue with above stated tx goals; Psycho-education on anxiety management strategies; Goal setting for health and wellness; Schedule and Routine exploration; Processing grief; continued education on grief and loss

## 2025-07-31 ENCOUNTER — APPOINTMENT (OUTPATIENT)
Dept: BEHAVIORAL HEALTH | Facility: CLINIC | Age: 45
End: 2025-07-31
Payer: COMMERCIAL

## 2025-07-31 DIAGNOSIS — F41.1 GAD (GENERALIZED ANXIETY DISORDER): ICD-10-CM

## 2025-07-31 DIAGNOSIS — F43.21 GRIEF: ICD-10-CM

## 2025-07-31 DIAGNOSIS — F33.0 MILD EPISODE OF RECURRENT MAJOR DEPRESSIVE DISORDER: ICD-10-CM

## 2025-07-31 PROCEDURE — 90837 PSYTX W PT 60 MINUTES: CPT | Performed by: PSYCHOLOGIST

## 2025-07-31 NOTE — PROGRESS NOTES
Start time: 9:02am  End time: 9:59am    The patient was informed of the current need to conduct treatment via telephone or telehealth due to Covid-19 pandemic. Patient consented to the use of the platform that may not be HIPAA compliant. I have confirmed the patient's identity via the following (minimum of three) acceptable identifiers as per  Policy PH-9:   1. Last 4 of social:   2. :   3. Address:    Telephone/Televideo Informed Consent for Psychotherapy was reviewed with the patient as follows:  There are potential benefits and risks of the use of telephone or video-conferencing that differ from in-person sessions. Specifically, the telephone or televideo system we are using may not be HIPAA compliant and may present limits to patient confidentiality. Confidentiality still applies for telepsychology services, and nobody will record the session without your permission. You agree to use the telephone or video-conferencing platform selected for our virtual sessions, and I will explain how to use it.  1)             You need to use a webcam or smartphone during the session.  2)             It is important that you be in a quiet, private space that is free of distractions (including cell phone or other devices) during the session.  3)             It is important to use a secure internet connection rather than public/free Wi-Fi.  4)             It is important to be on time. If you need to cancel or change your tele-appointment, you must notify the psychologist in advance by phone or email.  5)             We need a back-up plan (e.g., phone number where you can be reached) to restart the session or to reschedule it, in the event of technical problems.  6)             We need a safety plan that includes at least one emergency contact and the closest emergency room to your location, in the event of a crisis situation.  7)             If you are not an adult, we need the permission of your parent or legal guardian  "(and their contact information) for you to participate in telepsychology sessions.  Understanding and verbal agreement was attested to by the patient.    Magaly reported that she would like her notes to remain blocked at this time.     Reason for care: Anxiety; Depression  Method of therapy: Supportive; CBT  Therapy summary: Magaly reported that things are \"going ok.\" She has been writing down things she wants to follow-up on in therapy.     She processed seeing her ex at a party and feeling \"uncomfortable.\" She discussed feeling as though she has \"changed so much,\" since they were together and explored how her life looks differently now. She described feeling more confident in her decisions and more feeling more security in her current relationship.     She discussed recent challenges while watching her nephew. She processed becoming frustrated with her partner and mother-in-law in relation to their dynamic with her nephew.    She shared recent frustrations at home and differences between her and her partner in the ways they address chores. She discussed wanting to be more vocal and communicate her needs while also connecting more with him.     She described ways she is coping with stress (e.g., keeping up with routine, walking, taking breaks when needed).    Action plan: Continue routine and behavioral activation; continue boundary setting with family; engage in self-care for stress management     She denies SI/HI/AVH. We will follow-up in two weeks.    Identified goals/objectives (ongoing): Processing grief; engaging in more structured relaxation day to day to help lower nervous system response; Continue engaging in self-care activities;  Continue exercising; reframe negative thinking with writing  Response to therapy: Engaged  Treatment plan and process: Continue with above stated tx goals; Psycho-education on anxiety management strategies; Goal setting for health and wellness; Schedule and Routine " exploration; Processing grief; continued education on grief and loss

## 2025-08-11 ENCOUNTER — APPOINTMENT (OUTPATIENT)
Dept: BEHAVIORAL HEALTH | Facility: CLINIC | Age: 45
End: 2025-08-11
Payer: COMMERCIAL

## 2025-08-28 ENCOUNTER — APPOINTMENT (OUTPATIENT)
Dept: BEHAVIORAL HEALTH | Facility: CLINIC | Age: 45
End: 2025-08-28
Payer: COMMERCIAL

## 2025-08-28 DIAGNOSIS — F33.0 MILD EPISODE OF RECURRENT MAJOR DEPRESSIVE DISORDER: ICD-10-CM

## 2025-08-28 DIAGNOSIS — F41.1 GAD (GENERALIZED ANXIETY DISORDER): ICD-10-CM

## 2025-08-28 DIAGNOSIS — F43.21 GRIEF: ICD-10-CM

## 2025-08-28 PROCEDURE — 90837 PSYTX W PT 60 MINUTES: CPT | Performed by: PSYCHOLOGIST

## 2025-09-03 ENCOUNTER — APPOINTMENT (OUTPATIENT)
Dept: PODIATRY | Facility: CLINIC | Age: 45
End: 2025-09-03
Payer: COMMERCIAL

## 2025-09-11 ENCOUNTER — APPOINTMENT (OUTPATIENT)
Dept: BEHAVIORAL HEALTH | Facility: CLINIC | Age: 45
End: 2025-09-11
Payer: COMMERCIAL

## 2025-09-29 ENCOUNTER — APPOINTMENT (OUTPATIENT)
Dept: BEHAVIORAL HEALTH | Facility: CLINIC | Age: 45
End: 2025-09-29
Payer: COMMERCIAL

## 2025-10-13 ENCOUNTER — APPOINTMENT (OUTPATIENT)
Dept: BEHAVIORAL HEALTH | Facility: CLINIC | Age: 45
End: 2025-10-13
Payer: COMMERCIAL

## 2025-10-27 ENCOUNTER — APPOINTMENT (OUTPATIENT)
Dept: BEHAVIORAL HEALTH | Facility: CLINIC | Age: 45
End: 2025-10-27
Payer: COMMERCIAL

## 2025-11-13 ENCOUNTER — APPOINTMENT (OUTPATIENT)
Dept: PODIATRY | Facility: CLINIC | Age: 45
End: 2025-11-13
Payer: COMMERCIAL

## 2025-12-16 ENCOUNTER — APPOINTMENT (OUTPATIENT)
Dept: PRIMARY CARE | Facility: CLINIC | Age: 45
End: 2025-12-16
Payer: COMMERCIAL